# Patient Record
Sex: MALE | Race: WHITE | NOT HISPANIC OR LATINO | Employment: OTHER | URBAN - METROPOLITAN AREA
[De-identification: names, ages, dates, MRNs, and addresses within clinical notes are randomized per-mention and may not be internally consistent; named-entity substitution may affect disease eponyms.]

---

## 2017-08-27 ENCOUNTER — HOSPITAL ENCOUNTER (INPATIENT)
Facility: HOSPITAL | Age: 72
LOS: 2 days | Discharge: HOME | End: 2017-08-29
Attending: INTERNAL MEDICINE | Admitting: INTERNAL MEDICINE

## 2017-08-27 DIAGNOSIS — I24.9 ACS (ACUTE CORONARY SYNDROME) (HCC): Primary | ICD-10-CM

## 2017-08-27 DIAGNOSIS — I25.119 CORONARY ARTERY DISEASE INVOLVING NATIVE CORONARY ARTERY OF NATIVE HEART WITH ANGINA PECTORIS (HCC): ICD-10-CM

## 2017-08-27 PROBLEM — I21.4 NSTEMI (NON-ST ELEVATED MYOCARDIAL INFARCTION) (HCC): Status: ACTIVE | Noted: 2017-08-27

## 2017-08-27 PROBLEM — I10 HYPERTENSION: Chronic | Status: ACTIVE | Noted: 2017-08-27

## 2017-08-27 PROBLEM — I25.10 CAD (CORONARY ARTERY DISEASE): Chronic | Status: ACTIVE | Noted: 2017-08-27

## 2017-08-27 PROBLEM — E78.5 HYPERLIPIDEMIA: Chronic | Status: ACTIVE | Noted: 2017-08-27

## 2017-08-27 PROBLEM — E11.9 DIABETES MELLITUS (HCC): Chronic | Status: ACTIVE | Noted: 2017-08-27

## 2017-08-27 PROBLEM — I10 ACCELERATED HYPERTENSION: Status: ACTIVE | Noted: 2017-08-27

## 2017-08-27 LAB
GLUCOSE BLDC GLUCOMTR-MCNC: 179 MG/DL (ref 70–130)
TROPONIN I SERPL-MCNC: 5.5 NG/ML

## 2017-08-27 PROCEDURE — 93005 ELECTROCARDIOGRAM TRACING: CPT | Performed by: INTERNAL MEDICINE

## 2017-08-27 PROCEDURE — 63710000001 INSULIN DETEMIR PER 5 UNITS: Performed by: FAMILY MEDICINE

## 2017-08-27 PROCEDURE — 25010000002 ENOXAPARIN PER 10 MG: Performed by: FAMILY MEDICINE

## 2017-08-27 PROCEDURE — 84484 ASSAY OF TROPONIN QUANT: CPT | Performed by: FAMILY MEDICINE

## 2017-08-27 PROCEDURE — 82962 GLUCOSE BLOOD TEST: CPT

## 2017-08-27 PROCEDURE — 99291 CRITICAL CARE FIRST HOUR: CPT | Performed by: FAMILY MEDICINE

## 2017-08-27 PROCEDURE — 63710000001 INSULIN LISPRO (HUMAN) PER 5 UNITS: Performed by: FAMILY MEDICINE

## 2017-08-27 PROCEDURE — 93005 ELECTROCARDIOGRAM TRACING: CPT | Performed by: FAMILY MEDICINE

## 2017-08-27 RX ORDER — ACETAMINOPHEN 325 MG/1
650 TABLET ORAL EVERY 4 HOURS PRN
Status: DISCONTINUED | OUTPATIENT
Start: 2017-08-27 | End: 2017-08-29 | Stop reason: HOSPADM

## 2017-08-27 RX ORDER — PANTOPRAZOLE SODIUM 40 MG/1
40 TABLET, DELAYED RELEASE ORAL DAILY
Status: DISCONTINUED | OUTPATIENT
Start: 2017-08-28 | End: 2017-08-29 | Stop reason: HOSPADM

## 2017-08-27 RX ORDER — CLOPIDOGREL BISULFATE 75 MG/1
600 TABLET ORAL ONCE
Status: DISCONTINUED | OUTPATIENT
Start: 2017-08-27 | End: 2017-08-27

## 2017-08-27 RX ORDER — MONTELUKAST SODIUM 10 MG/1
10 TABLET ORAL NIGHTLY
Status: DISCONTINUED | OUTPATIENT
Start: 2017-08-27 | End: 2017-08-29 | Stop reason: HOSPADM

## 2017-08-27 RX ORDER — FENOFIBRATE 50 MG/1
50 CAPSULE ORAL NIGHTLY
COMMUNITY
End: 2017-08-29 | Stop reason: HOSPADM

## 2017-08-27 RX ORDER — PANTOPRAZOLE SODIUM 40 MG/1
40 TABLET, DELAYED RELEASE ORAL DAILY
COMMUNITY

## 2017-08-27 RX ORDER — CLOTRIMAZOLE AND BETAMETHASONE DIPROPIONATE 10; .64 MG/G; MG/G
CREAM TOPICAL 2 TIMES DAILY PRN
COMMUNITY

## 2017-08-27 RX ORDER — LORAZEPAM 2 MG/ML
1 INJECTION INTRAMUSCULAR EVERY 4 HOURS PRN
Status: DISCONTINUED | OUTPATIENT
Start: 2017-08-27 | End: 2017-08-29 | Stop reason: HOSPADM

## 2017-08-27 RX ORDER — DEXTROSE MONOHYDRATE 25 G/50ML
25 INJECTION, SOLUTION INTRAVENOUS
Status: DISCONTINUED | OUTPATIENT
Start: 2017-08-27 | End: 2017-08-29 | Stop reason: HOSPADM

## 2017-08-27 RX ORDER — LISINOPRIL 10 MG/1
10 TABLET ORAL 2 TIMES DAILY
Status: DISCONTINUED | OUTPATIENT
Start: 2017-08-27 | End: 2017-08-29 | Stop reason: HOSPADM

## 2017-08-27 RX ORDER — ASPIRIN 81 MG/1
81 TABLET ORAL DAILY
Status: DISCONTINUED | OUTPATIENT
Start: 2017-08-28 | End: 2017-08-29 | Stop reason: HOSPADM

## 2017-08-27 RX ORDER — NALOXONE HCL 0.4 MG/ML
0.4 VIAL (ML) INJECTION
Status: DISCONTINUED | OUTPATIENT
Start: 2017-08-27 | End: 2017-08-29 | Stop reason: HOSPADM

## 2017-08-27 RX ORDER — FENOFIBRATE 48 MG/1
48 TABLET, COATED ORAL DAILY
Status: DISCONTINUED | OUTPATIENT
Start: 2017-08-27 | End: 2017-08-28

## 2017-08-27 RX ORDER — NITROGLYCERIN 20 MG/100ML
10-50 INJECTION INTRAVENOUS
Status: DISCONTINUED | OUTPATIENT
Start: 2017-08-27 | End: 2017-08-28

## 2017-08-27 RX ORDER — TERAZOSIN 5 MG/1
5 CAPSULE ORAL NIGHTLY
COMMUNITY

## 2017-08-27 RX ORDER — CLOPIDOGREL BISULFATE 75 MG/1
75 TABLET ORAL DAILY
Status: DISCONTINUED | OUTPATIENT
Start: 2017-08-28 | End: 2017-08-27

## 2017-08-27 RX ORDER — ASPIRIN 81 MG/1
324 TABLET, CHEWABLE ORAL ONCE
Status: DISCONTINUED | OUTPATIENT
Start: 2017-08-27 | End: 2017-08-27

## 2017-08-27 RX ORDER — CARVEDILOL 25 MG/1
25 TABLET ORAL 2 TIMES DAILY WITH MEALS
COMMUNITY

## 2017-08-27 RX ORDER — TERAZOSIN 5 MG/1
5 CAPSULE ORAL NIGHTLY
Status: DISCONTINUED | OUTPATIENT
Start: 2017-08-27 | End: 2017-08-29 | Stop reason: HOSPADM

## 2017-08-27 RX ORDER — NATEGLINIDE 120 MG/1
120 TABLET ORAL 3 TIMES DAILY
COMMUNITY

## 2017-08-27 RX ORDER — MONTELUKAST SODIUM 10 MG/1
10 TABLET ORAL NIGHTLY
COMMUNITY

## 2017-08-27 RX ORDER — FAMOTIDINE 20 MG/1
10 TABLET, FILM COATED ORAL 2 TIMES DAILY
Status: DISCONTINUED | OUTPATIENT
Start: 2017-08-27 | End: 2017-08-28

## 2017-08-27 RX ORDER — ONDANSETRON 2 MG/ML
4 INJECTION INTRAMUSCULAR; INTRAVENOUS EVERY 6 HOURS PRN
Status: DISCONTINUED | OUTPATIENT
Start: 2017-08-27 | End: 2017-08-29 | Stop reason: HOSPADM

## 2017-08-27 RX ORDER — PROMETHAZINE HYDROCHLORIDE 25 MG/1
25 TABLET ORAL EVERY 6 HOURS PRN
COMMUNITY

## 2017-08-27 RX ORDER — FAMOTIDINE 10 MG
10 TABLET ORAL 2 TIMES DAILY
COMMUNITY

## 2017-08-27 RX ORDER — SIMVASTATIN 40 MG
40 TABLET ORAL NIGHTLY
COMMUNITY
End: 2017-08-29 | Stop reason: HOSPADM

## 2017-08-27 RX ORDER — LANOLIN ALCOHOL/MO/W.PET/CERES
1000 CREAM (GRAM) TOPICAL
COMMUNITY

## 2017-08-27 RX ORDER — DIPHENHYDRAMINE HYDROCHLORIDE 50 MG/ML
25 INJECTION INTRAMUSCULAR; INTRAVENOUS EVERY 6 HOURS PRN
Status: DISCONTINUED | OUTPATIENT
Start: 2017-08-27 | End: 2017-08-29 | Stop reason: HOSPADM

## 2017-08-27 RX ORDER — AMMONIUM LACTATE 120 MG/G
1 CREAM TOPICAL AS NEEDED
COMMUNITY

## 2017-08-27 RX ORDER — CETIRIZINE HYDROCHLORIDE 10 MG/1
10 TABLET ORAL DAILY
Status: DISCONTINUED | OUTPATIENT
Start: 2017-08-27 | End: 2017-08-29 | Stop reason: HOSPADM

## 2017-08-27 RX ORDER — CARVEDILOL 12.5 MG/1
25 TABLET ORAL 2 TIMES DAILY WITH MEALS
Status: DISCONTINUED | OUTPATIENT
Start: 2017-08-27 | End: 2017-08-29 | Stop reason: HOSPADM

## 2017-08-27 RX ORDER — ATORVASTATIN CALCIUM 40 MG/1
40 TABLET, FILM COATED ORAL NIGHTLY
Status: DISCONTINUED | OUTPATIENT
Start: 2017-08-27 | End: 2017-08-29 | Stop reason: HOSPADM

## 2017-08-27 RX ORDER — NICOTINE POLACRILEX 4 MG
15 LOZENGE BUCCAL
Status: DISCONTINUED | OUTPATIENT
Start: 2017-08-27 | End: 2017-08-29 | Stop reason: HOSPADM

## 2017-08-27 RX ORDER — SODIUM CHLORIDE 0.9 % (FLUSH) 0.9 %
1-10 SYRINGE (ML) INJECTION AS NEEDED
Status: DISCONTINUED | OUTPATIENT
Start: 2017-08-27 | End: 2017-08-29 | Stop reason: HOSPADM

## 2017-08-27 RX ORDER — BUDESONIDE AND FORMOTEROL FUMARATE DIHYDRATE 80; 4.5 UG/1; UG/1
2 AEROSOL RESPIRATORY (INHALATION) 2 TIMES DAILY PRN
COMMUNITY

## 2017-08-27 RX ORDER — MORPHINE SULFATE 2 MG/ML
1 INJECTION, SOLUTION INTRAMUSCULAR; INTRAVENOUS EVERY 4 HOURS PRN
Status: DISCONTINUED | OUTPATIENT
Start: 2017-08-27 | End: 2017-08-27

## 2017-08-27 RX ORDER — BUDESONIDE AND FORMOTEROL FUMARATE DIHYDRATE 80; 4.5 UG/1; UG/1
2 AEROSOL RESPIRATORY (INHALATION)
Status: DISCONTINUED | OUTPATIENT
Start: 2017-08-27 | End: 2017-08-29 | Stop reason: HOSPADM

## 2017-08-27 RX ORDER — LISINOPRIL 10 MG/1
20 TABLET ORAL 2 TIMES DAILY
COMMUNITY
End: 2017-08-29

## 2017-08-27 RX ORDER — ATORVASTATIN CALCIUM 20 MG/1
20 TABLET, FILM COATED ORAL DAILY
Status: DISCONTINUED | OUTPATIENT
Start: 2017-08-27 | End: 2017-08-27

## 2017-08-27 RX ORDER — CETIRIZINE HYDROCHLORIDE 10 MG/1
10 TABLET ORAL DAILY
COMMUNITY

## 2017-08-27 RX ADMIN — CETIRIZINE HYDROCHLORIDE 10 MG: 10 TABLET, FILM COATED ORAL at 21:44

## 2017-08-27 RX ADMIN — NITROGLYCERIN 50 MCG/MIN: 20 INJECTION INTRAVENOUS at 19:47

## 2017-08-27 RX ADMIN — TERAZOSIN HYDROCHLORIDE ANHYDROUS 5 MG: 5 CAPSULE ORAL at 21:44

## 2017-08-27 RX ADMIN — LISINOPRIL 10 MG: 10 TABLET ORAL at 21:44

## 2017-08-27 RX ADMIN — ATORVASTATIN CALCIUM 40 MG: 40 TABLET, FILM COATED ORAL at 21:44

## 2017-08-27 RX ADMIN — CARVEDILOL 25 MG: 12.5 TABLET, FILM COATED ORAL at 21:44

## 2017-08-27 RX ADMIN — INSULIN LISPRO 2 UNITS: 100 INJECTION, SOLUTION INTRAVENOUS; SUBCUTANEOUS at 21:45

## 2017-08-27 RX ADMIN — ENOXAPARIN SODIUM 100 MG: 100 INJECTION SUBCUTANEOUS at 21:44

## 2017-08-27 RX ADMIN — ACETAMINOPHEN 650 MG: 325 TABLET, FILM COATED ORAL at 21:44

## 2017-08-27 RX ADMIN — INSULIN DETEMIR 10 UNITS: 100 INJECTION, SOLUTION SUBCUTANEOUS at 21:44

## 2017-08-28 PROBLEM — E11.9 TYPE 2 DIABETES MELLITUS (HCC): Status: ACTIVE | Noted: 2017-08-27

## 2017-08-28 PROBLEM — I10 ACCELERATED HYPERTENSION: Status: RESOLVED | Noted: 2017-08-27 | Resolved: 2017-08-28

## 2017-08-28 PROBLEM — I10 ESSENTIAL HYPERTENSION: Status: ACTIVE | Noted: 2017-08-27

## 2017-08-28 PROBLEM — I25.119 CORONARY ARTERY DISEASE INVOLVING NATIVE CORONARY ARTERY OF NATIVE HEART WITH ANGINA PECTORIS (HCC): Status: ACTIVE | Noted: 2017-08-27

## 2017-08-28 PROBLEM — I24.9 ACS (ACUTE CORONARY SYNDROME) (HCC): Status: RESOLVED | Noted: 2017-08-27 | Resolved: 2017-08-28

## 2017-08-28 PROBLEM — E78.5 HYPERLIPIDEMIA LDL GOAL <70: Status: ACTIVE | Noted: 2017-08-27

## 2017-08-28 LAB
ACT BLD: 235 SECONDS (ref 82–152)
ANION GAP SERPL CALCULATED.3IONS-SCNC: 3 MMOL/L (ref 3–11)
ARTICHOKE IGE QN: 77 MG/DL (ref 0–130)
BUN BLD-MCNC: 17 MG/DL (ref 9–23)
BUN/CREAT SERPL: 13.1 (ref 7–25)
CALCIUM SPEC-SCNC: 9.1 MG/DL (ref 8.7–10.4)
CHLORIDE SERPL-SCNC: 107 MMOL/L (ref 99–109)
CHOLEST SERPL-MCNC: 134 MG/DL (ref 0–200)
CO2 SERPL-SCNC: 25 MMOL/L (ref 20–31)
CREAT BLD-MCNC: 1.3 MG/DL (ref 0.6–1.3)
GFR SERPL CREATININE-BSD FRML MDRD: 55 ML/MIN/1.73
GLUCOSE BLD-MCNC: 136 MG/DL (ref 70–100)
GLUCOSE BLDC GLUCOMTR-MCNC: 117 MG/DL (ref 70–130)
GLUCOSE BLDC GLUCOMTR-MCNC: 167 MG/DL (ref 70–130)
GLUCOSE BLDC GLUCOMTR-MCNC: 169 MG/DL (ref 70–130)
GLUCOSE BLDC GLUCOMTR-MCNC: 207 MG/DL (ref 70–130)
HBA1C MFR BLD: 6.8 % (ref 4.8–5.6)
HDLC SERPL-MCNC: 23 MG/DL (ref 40–60)
POTASSIUM BLD-SCNC: 4.1 MMOL/L (ref 3.5–5.5)
SODIUM BLD-SCNC: 135 MMOL/L (ref 132–146)
TRIGL SERPL-MCNC: 435 MG/DL (ref 0–150)
TROPONIN I SERPL-MCNC: 7.68 NG/ML
TROPONIN I SERPL-MCNC: 8.66 NG/ML
TSH SERPL DL<=0.05 MIU/L-ACNC: 7.26 MIU/ML (ref 0.35–5.35)

## 2017-08-28 PROCEDURE — C1725 CATH, TRANSLUMIN NON-LASER: HCPCS | Performed by: INTERNAL MEDICINE

## 2017-08-28 PROCEDURE — C1874 STENT, COATED/COV W/DEL SYS: HCPCS | Performed by: INTERNAL MEDICINE

## 2017-08-28 PROCEDURE — 027034Z DILATION OF CORONARY ARTERY, ONE ARTERY WITH DRUG-ELUTING INTRALUMINAL DEVICE, PERCUTANEOUS APPROACH: ICD-10-PCS | Performed by: INTERNAL MEDICINE

## 2017-08-28 PROCEDURE — 25010000002 MIDAZOLAM PER 1 MG: Performed by: INTERNAL MEDICINE

## 2017-08-28 PROCEDURE — 0 IOPAMIDOL PER 1 ML: Performed by: INTERNAL MEDICINE

## 2017-08-28 PROCEDURE — B2111ZZ FLUOROSCOPY OF MULTIPLE CORONARY ARTERIES USING LOW OSMOLAR CONTRAST: ICD-10-PCS | Performed by: INTERNAL MEDICINE

## 2017-08-28 PROCEDURE — 99222 1ST HOSP IP/OBS MODERATE 55: CPT | Performed by: INTERNAL MEDICINE

## 2017-08-28 PROCEDURE — 93458 L HRT ARTERY/VENTRICLE ANGIO: CPT | Performed by: INTERNAL MEDICINE

## 2017-08-28 PROCEDURE — C1769 GUIDE WIRE: HCPCS | Performed by: INTERNAL MEDICINE

## 2017-08-28 PROCEDURE — 93005 ELECTROCARDIOGRAM TRACING: CPT | Performed by: FAMILY MEDICINE

## 2017-08-28 PROCEDURE — 80061 LIPID PANEL: CPT | Performed by: FAMILY MEDICINE

## 2017-08-28 PROCEDURE — 84443 ASSAY THYROID STIM HORMONE: CPT | Performed by: FAMILY MEDICINE

## 2017-08-28 PROCEDURE — 80048 BASIC METABOLIC PNL TOTAL CA: CPT | Performed by: FAMILY MEDICINE

## 2017-08-28 PROCEDURE — 83036 HEMOGLOBIN GLYCOSYLATED A1C: CPT | Performed by: FAMILY MEDICINE

## 2017-08-28 PROCEDURE — C1894 INTRO/SHEATH, NON-LASER: HCPCS | Performed by: INTERNAL MEDICINE

## 2017-08-28 PROCEDURE — 4A023N7 MEASUREMENT OF CARDIAC SAMPLING AND PRESSURE, LEFT HEART, PERCUTANEOUS APPROACH: ICD-10-PCS | Performed by: INTERNAL MEDICINE

## 2017-08-28 PROCEDURE — C1887 CATHETER, GUIDING: HCPCS | Performed by: INTERNAL MEDICINE

## 2017-08-28 PROCEDURE — 84484 ASSAY OF TROPONIN QUANT: CPT | Performed by: FAMILY MEDICINE

## 2017-08-28 PROCEDURE — 92978 ENDOLUMINL IVUS OCT C 1ST: CPT | Performed by: INTERNAL MEDICINE

## 2017-08-28 PROCEDURE — 25010000002 HEPARIN (PORCINE) PER 1000 UNITS: Performed by: INTERNAL MEDICINE

## 2017-08-28 PROCEDURE — C1753 CATH, INTRAVAS ULTRASOUND: HCPCS | Performed by: INTERNAL MEDICINE

## 2017-08-28 PROCEDURE — 92928 PRQ TCAT PLMT NTRAC ST 1 LES: CPT | Performed by: INTERNAL MEDICINE

## 2017-08-28 PROCEDURE — 82962 GLUCOSE BLOOD TEST: CPT

## 2017-08-28 PROCEDURE — C9600 PERC DRUG-EL COR STENT SING: HCPCS | Performed by: INTERNAL MEDICINE

## 2017-08-28 PROCEDURE — 63710000001 INSULIN DETEMIR PER 5 UNITS: Performed by: FAMILY MEDICINE

## 2017-08-28 PROCEDURE — B2151ZZ FLUOROSCOPY OF LEFT HEART USING LOW OSMOLAR CONTRAST: ICD-10-PCS | Performed by: INTERNAL MEDICINE

## 2017-08-28 PROCEDURE — B221Z2Z COMPUTERIZED TOMOGRAPHY (CT SCAN) OF MULTIPLE CORONARY ARTERIES USING INTRAVASCULAR OPTICAL COHERENCE: ICD-10-PCS | Performed by: INTERNAL MEDICINE

## 2017-08-28 PROCEDURE — 85347 COAGULATION TIME ACTIVATED: CPT

## 2017-08-28 PROCEDURE — 25010000002 FENTANYL CITRATE (PF) 100 MCG/2ML SOLUTION: Performed by: INTERNAL MEDICINE

## 2017-08-28 PROCEDURE — 93005 ELECTROCARDIOGRAM TRACING: CPT | Performed by: INTERNAL MEDICINE

## 2017-08-28 DEVICE — XIENCE ALPINE EVEROLIMUS ELUTING CORONARY STENT SYSTEM 4.00 MM X 38 MM / RAPID-EXCHANGE
Type: IMPLANTABLE DEVICE | Status: FUNCTIONAL
Brand: XIENCE ALPINE

## 2017-08-28 RX ORDER — HEPARIN SODIUM 1000 [USP'U]/ML
INJECTION, SOLUTION INTRAVENOUS; SUBCUTANEOUS AS NEEDED
Status: DISCONTINUED | OUTPATIENT
Start: 2017-08-28 | End: 2017-08-28 | Stop reason: HOSPADM

## 2017-08-28 RX ORDER — FENOFIBRATE 145 MG/1
145 TABLET, COATED ORAL DAILY
Status: DISCONTINUED | OUTPATIENT
Start: 2017-08-29 | End: 2017-08-29 | Stop reason: HOSPADM

## 2017-08-28 RX ORDER — FENTANYL CITRATE 50 UG/ML
INJECTION, SOLUTION INTRAMUSCULAR; INTRAVENOUS AS NEEDED
Status: DISCONTINUED | OUTPATIENT
Start: 2017-08-28 | End: 2017-08-28 | Stop reason: HOSPADM

## 2017-08-28 RX ORDER — MIDAZOLAM HYDROCHLORIDE 1 MG/ML
INJECTION INTRAMUSCULAR; INTRAVENOUS AS NEEDED
Status: DISCONTINUED | OUTPATIENT
Start: 2017-08-28 | End: 2017-08-28 | Stop reason: HOSPADM

## 2017-08-28 RX ORDER — NITROGLYCERIN 5 MG/ML
INJECTION, SOLUTION INTRAVENOUS AS NEEDED
Status: DISCONTINUED | OUTPATIENT
Start: 2017-08-28 | End: 2017-08-28 | Stop reason: HOSPADM

## 2017-08-28 RX ORDER — PRASUGREL 5 MG/1
TABLET, FILM COATED ORAL AS NEEDED
Status: DISCONTINUED | OUTPATIENT
Start: 2017-08-28 | End: 2017-08-28 | Stop reason: HOSPADM

## 2017-08-28 RX ORDER — FAMOTIDINE 20 MG/1
20 TABLET, FILM COATED ORAL 2 TIMES DAILY
Status: DISCONTINUED | OUTPATIENT
Start: 2017-08-28 | End: 2017-08-29 | Stop reason: HOSPADM

## 2017-08-28 RX ORDER — SODIUM CHLORIDE 9 MG/ML
1.5 INJECTION, SOLUTION INTRAVENOUS CONTINUOUS
Status: ACTIVE | OUTPATIENT
Start: 2017-08-28 | End: 2017-08-28

## 2017-08-28 RX ORDER — CLOPIDOGREL BISULFATE 75 MG/1
75 TABLET ORAL DAILY
Status: DISCONTINUED | OUTPATIENT
Start: 2017-08-29 | End: 2017-08-29 | Stop reason: HOSPADM

## 2017-08-28 RX ORDER — PHENYLEPHRINE HCL IN 0.9% NACL 0.5 MG/5ML
SYRINGE (ML) INTRAVENOUS AS NEEDED
Status: DISCONTINUED | OUTPATIENT
Start: 2017-08-28 | End: 2017-08-28 | Stop reason: HOSPADM

## 2017-08-28 RX ORDER — LIDOCAINE HYDROCHLORIDE 10 MG/ML
INJECTION, SOLUTION INFILTRATION; PERINEURAL AS NEEDED
Status: DISCONTINUED | OUTPATIENT
Start: 2017-08-28 | End: 2017-08-28 | Stop reason: HOSPADM

## 2017-08-28 RX ORDER — ATROPINE SULFATE 1 MG/ML
INJECTION, SOLUTION INTRAMUSCULAR; INTRAVENOUS; SUBCUTANEOUS AS NEEDED
Status: DISCONTINUED | OUTPATIENT
Start: 2017-08-28 | End: 2017-08-28 | Stop reason: HOSPADM

## 2017-08-28 RX ADMIN — ACETAMINOPHEN 650 MG: 325 TABLET, FILM COATED ORAL at 21:28

## 2017-08-28 RX ADMIN — FAMOTIDINE 10 MG: 20 TABLET, FILM COATED ORAL at 08:16

## 2017-08-28 RX ADMIN — FAMOTIDINE 20 MG: 20 TABLET, FILM COATED ORAL at 17:32

## 2017-08-28 RX ADMIN — MONTELUKAST SODIUM 10 MG: 10 TABLET, FILM COATED ORAL at 20:24

## 2017-08-28 RX ADMIN — LISINOPRIL 10 MG: 10 TABLET ORAL at 17:33

## 2017-08-28 RX ADMIN — CARVEDILOL 25 MG: 12.5 TABLET, FILM COATED ORAL at 17:32

## 2017-08-28 RX ADMIN — INSULIN LISPRO 4 UNITS: 100 INJECTION, SOLUTION INTRAVENOUS; SUBCUTANEOUS at 20:35

## 2017-08-28 RX ADMIN — SODIUM CHLORIDE 1.5 ML/KG/HR: 9 INJECTION, SOLUTION INTRAVENOUS at 17:32

## 2017-08-28 RX ADMIN — ASPIRIN 81 MG: 81 TABLET, COATED ORAL at 08:17

## 2017-08-28 RX ADMIN — INSULIN LISPRO 2 UNITS: 100 INJECTION, SOLUTION INTRAVENOUS; SUBCUTANEOUS at 08:19

## 2017-08-28 RX ADMIN — CETIRIZINE HYDROCHLORIDE 10 MG: 10 TABLET, FILM COATED ORAL at 08:16

## 2017-08-28 RX ADMIN — PANTOPRAZOLE SODIUM 40 MG: 40 TABLET, DELAYED RELEASE ORAL at 08:16

## 2017-08-28 RX ADMIN — CARVEDILOL 25 MG: 12.5 TABLET, FILM COATED ORAL at 08:17

## 2017-08-28 RX ADMIN — ATORVASTATIN CALCIUM 40 MG: 40 TABLET, FILM COATED ORAL at 20:24

## 2017-08-28 RX ADMIN — ACETAMINOPHEN 650 MG: 325 TABLET, FILM COATED ORAL at 04:00

## 2017-08-28 RX ADMIN — INSULIN LISPRO 2 UNITS: 100 INJECTION, SOLUTION INTRAVENOUS; SUBCUTANEOUS at 13:05

## 2017-08-28 RX ADMIN — ACETAMINOPHEN 650 MG: 325 TABLET, FILM COATED ORAL at 11:18

## 2017-08-28 RX ADMIN — TERAZOSIN HYDROCHLORIDE ANHYDROUS 5 MG: 5 CAPSULE ORAL at 20:24

## 2017-08-28 RX ADMIN — INSULIN DETEMIR 10 UNITS: 100 INJECTION, SOLUTION SUBCUTANEOUS at 20:24

## 2017-08-29 VITALS
WEIGHT: 225 LBS | RESPIRATION RATE: 16 BRPM | HEIGHT: 70 IN | HEART RATE: 70 BPM | TEMPERATURE: 97.9 F | SYSTOLIC BLOOD PRESSURE: 157 MMHG | BODY MASS INDEX: 32.21 KG/M2 | OXYGEN SATURATION: 96 % | DIASTOLIC BLOOD PRESSURE: 97 MMHG

## 2017-08-29 LAB
GLUCOSE BLDC GLUCOMTR-MCNC: 151 MG/DL (ref 70–130)
GLUCOSE BLDC GLUCOMTR-MCNC: 172 MG/DL (ref 70–130)

## 2017-08-29 PROCEDURE — 82962 GLUCOSE BLOOD TEST: CPT

## 2017-08-29 PROCEDURE — 99238 HOSP IP/OBS DSCHRG MGMT 30/<: CPT | Performed by: INTERNAL MEDICINE

## 2017-08-29 PROCEDURE — 93010 ELECTROCARDIOGRAM REPORT: CPT | Performed by: INTERNAL MEDICINE

## 2017-08-29 RX ORDER — ATORVASTATIN CALCIUM 40 MG/1
40 TABLET, FILM COATED ORAL NIGHTLY
Qty: 90 TABLET | Refills: 3 | Status: SHIPPED | OUTPATIENT
Start: 2017-08-29

## 2017-08-29 RX ORDER — AMLODIPINE BESYLATE 5 MG/1
5 TABLET ORAL DAILY PRN
COMMUNITY
Start: 2015-06-29

## 2017-08-29 RX ORDER — LISINOPRIL 10 MG/1
10 TABLET ORAL 2 TIMES DAILY
Qty: 30 TABLET | Refills: 2 | Status: SHIPPED | OUTPATIENT
Start: 2017-08-29

## 2017-08-29 RX ORDER — POLYETHYLENE GLYCOL 3350 17 G/17G
17 POWDER, FOR SOLUTION ORAL DAILY PRN
COMMUNITY

## 2017-08-29 RX ORDER — ASPIRIN 81 MG/1
81 TABLET ORAL DAILY
Qty: 90 TABLET | Refills: 3 | Status: SHIPPED | OUTPATIENT
Start: 2017-08-29

## 2017-08-29 RX ORDER — CLOPIDOGREL BISULFATE 75 MG/1
75 TABLET ORAL DAILY
Qty: 90 TABLET | Refills: 3 | Status: SHIPPED | OUTPATIENT
Start: 2017-08-29

## 2017-08-29 RX ORDER — CARVEDILOL 12.5 MG/1
12.5 TABLET ORAL 2 TIMES DAILY
Status: ON HOLD | COMMUNITY
Start: 2015-06-29 | End: 2017-08-29

## 2017-08-29 RX ADMIN — CETIRIZINE HYDROCHLORIDE 10 MG: 10 TABLET, FILM COATED ORAL at 08:22

## 2017-08-29 RX ADMIN — ASPIRIN 81 MG: 81 TABLET, COATED ORAL at 08:22

## 2017-08-29 RX ADMIN — INSULIN LISPRO 2 UNITS: 100 INJECTION, SOLUTION INTRAVENOUS; SUBCUTANEOUS at 11:27

## 2017-08-29 RX ADMIN — CLOPIDOGREL BISULFATE 75 MG: 75 TABLET ORAL at 08:22

## 2017-08-29 RX ADMIN — CARVEDILOL 25 MG: 12.5 TABLET, FILM COATED ORAL at 08:22

## 2017-08-29 RX ADMIN — FAMOTIDINE 20 MG: 20 TABLET, FILM COATED ORAL at 08:22

## 2017-08-29 RX ADMIN — FENOFIBRATE 145 MG: 145 TABLET ORAL at 08:22

## 2017-08-29 RX ADMIN — LISINOPRIL 10 MG: 10 TABLET ORAL at 08:22

## 2017-08-29 RX ADMIN — PANTOPRAZOLE SODIUM 40 MG: 40 TABLET, DELAYED RELEASE ORAL at 08:22

## 2017-09-08 ENCOUNTER — HOSPITAL ENCOUNTER (INPATIENT)
Facility: HOSPITAL | Age: 72
LOS: 2 days | Discharge: HOME | End: 2017-09-12
Attending: EMERGENCY MEDICINE | Admitting: INTERNAL MEDICINE

## 2017-09-08 ENCOUNTER — TELEPHONE (OUTPATIENT)
Dept: CARDIOLOGY | Facility: CLINIC | Age: 72
End: 2017-09-08

## 2017-09-08 ENCOUNTER — APPOINTMENT (OUTPATIENT)
Dept: GENERAL RADIOLOGY | Facility: HOSPITAL | Age: 72
End: 2017-09-08

## 2017-09-08 DIAGNOSIS — R07.9 CHEST PAIN, UNSPECIFIED TYPE: Primary | ICD-10-CM

## 2017-09-08 DIAGNOSIS — R07.89 OTHER CHEST PAIN: Primary | ICD-10-CM

## 2017-09-08 DIAGNOSIS — I20.0 UNSTABLE ANGINA (HCC): ICD-10-CM

## 2017-09-08 DIAGNOSIS — R07.89 OTHER CHEST PAIN: ICD-10-CM

## 2017-09-08 DIAGNOSIS — I25.119 CORONARY ARTERY DISEASE INVOLVING NATIVE CORONARY ARTERY OF NATIVE HEART WITH ANGINA PECTORIS (HCC): ICD-10-CM

## 2017-09-08 PROBLEM — I21.4 NSTEMI (NON-ST ELEVATED MYOCARDIAL INFARCTION) (HCC): Status: RESOLVED | Noted: 2017-08-27 | Resolved: 2017-09-08

## 2017-09-08 LAB
ALBUMIN SERPL-MCNC: 4.6 G/DL (ref 3.2–4.8)
ALBUMIN/GLOB SERPL: 1.5 G/DL (ref 1.5–2.5)
ALP SERPL-CCNC: 55 U/L (ref 25–100)
ALT SERPL W P-5'-P-CCNC: 40 U/L (ref 7–40)
ANION GAP SERPL CALCULATED.3IONS-SCNC: 8 MMOL/L (ref 3–11)
AST SERPL-CCNC: 30 U/L (ref 0–33)
BASOPHILS # BLD AUTO: 0.04 10*3/MM3 (ref 0–0.2)
BASOPHILS NFR BLD AUTO: 0.6 % (ref 0–1)
BILIRUB SERPL-MCNC: 0.4 MG/DL (ref 0.3–1.2)
BNP SERPL-MCNC: 18 PG/ML (ref 0–100)
BUN BLD-MCNC: 16 MG/DL (ref 9–23)
BUN/CREAT SERPL: 16 (ref 7–25)
CALCIUM SPEC-SCNC: 9.6 MG/DL (ref 8.7–10.4)
CHLORIDE SERPL-SCNC: 105 MMOL/L (ref 99–109)
CO2 SERPL-SCNC: 25 MMOL/L (ref 20–31)
CREAT BLD-MCNC: 1 MG/DL (ref 0.6–1.3)
DEPRECATED RDW RBC AUTO: 41.7 FL (ref 37–54)
EOSINOPHIL # BLD AUTO: 0.16 10*3/MM3 (ref 0–0.3)
EOSINOPHIL NFR BLD AUTO: 2.5 % (ref 0–3)
ERYTHROCYTE [DISTWIDTH] IN BLOOD BY AUTOMATED COUNT: 13.5 % (ref 11.3–14.5)
GFR SERPL CREATININE-BSD FRML MDRD: 74 ML/MIN/1.73
GLOBULIN UR ELPH-MCNC: 3 GM/DL
GLUCOSE BLD-MCNC: 112 MG/DL (ref 70–100)
GLUCOSE BLDC GLUCOMTR-MCNC: 105 MG/DL (ref 70–130)
HCT VFR BLD AUTO: 42.3 % (ref 38.9–50.9)
HGB BLD-MCNC: 14 G/DL (ref 13.1–17.5)
HOLD SPECIMEN: NORMAL
HOLD SPECIMEN: NORMAL
IMM GRANULOCYTES # BLD: 0.03 10*3/MM3 (ref 0–0.03)
IMM GRANULOCYTES NFR BLD: 0.5 % (ref 0–0.6)
LIPASE SERPL-CCNC: 46 U/L (ref 6–51)
LYMPHOCYTES # BLD AUTO: 1.66 10*3/MM3 (ref 0.6–4.8)
LYMPHOCYTES NFR BLD AUTO: 26.3 % (ref 24–44)
MCH RBC QN AUTO: 28.1 PG (ref 27–31)
MCHC RBC AUTO-ENTMCNC: 33.1 G/DL (ref 32–36)
MCV RBC AUTO: 84.9 FL (ref 80–99)
MONOCYTES # BLD AUTO: 0.58 10*3/MM3 (ref 0–1)
MONOCYTES NFR BLD AUTO: 9.2 % (ref 0–12)
NEUTROPHILS # BLD AUTO: 3.85 10*3/MM3 (ref 1.5–8.3)
NEUTROPHILS NFR BLD AUTO: 60.9 % (ref 41–71)
PLATELET # BLD AUTO: 173 10*3/MM3 (ref 150–450)
PMV BLD AUTO: 9.9 FL (ref 6–12)
POTASSIUM BLD-SCNC: 4.2 MMOL/L (ref 3.5–5.5)
PROT SERPL-MCNC: 7.6 G/DL (ref 5.7–8.2)
RBC # BLD AUTO: 4.98 10*6/MM3 (ref 4.2–5.76)
SODIUM BLD-SCNC: 138 MMOL/L (ref 132–146)
TROPONIN I SERPL-MCNC: 0 NG/ML (ref 0–0.07)
TROPONIN I SERPL-MCNC: 0 NG/ML (ref 0–0.07)
WBC NRBC COR # BLD: 6.32 10*3/MM3 (ref 3.5–10.8)
WHOLE BLOOD HOLD SPECIMEN: NORMAL
WHOLE BLOOD HOLD SPECIMEN: NORMAL

## 2017-09-08 PROCEDURE — 84484 ASSAY OF TROPONIN QUANT: CPT

## 2017-09-08 PROCEDURE — 71010 HC CHEST PA OR AP: CPT

## 2017-09-08 PROCEDURE — 99223 1ST HOSP IP/OBS HIGH 75: CPT | Performed by: INTERNAL MEDICINE

## 2017-09-08 PROCEDURE — 93005 ELECTROCARDIOGRAM TRACING: CPT | Performed by: EMERGENCY MEDICINE

## 2017-09-08 PROCEDURE — 80053 COMPREHEN METABOLIC PANEL: CPT | Performed by: EMERGENCY MEDICINE

## 2017-09-08 PROCEDURE — 99285 EMERGENCY DEPT VISIT HI MDM: CPT

## 2017-09-08 PROCEDURE — 82962 GLUCOSE BLOOD TEST: CPT

## 2017-09-08 PROCEDURE — 83690 ASSAY OF LIPASE: CPT | Performed by: EMERGENCY MEDICINE

## 2017-09-08 PROCEDURE — 83880 ASSAY OF NATRIURETIC PEPTIDE: CPT | Performed by: EMERGENCY MEDICINE

## 2017-09-08 PROCEDURE — 93005 ELECTROCARDIOGRAM TRACING: CPT | Performed by: NURSE PRACTITIONER

## 2017-09-08 PROCEDURE — 85025 COMPLETE CBC W/AUTO DIFF WBC: CPT | Performed by: EMERGENCY MEDICINE

## 2017-09-08 RX ORDER — NICOTINE POLACRILEX 4 MG
15 LOZENGE BUCCAL
Status: DISCONTINUED | OUTPATIENT
Start: 2017-09-08 | End: 2017-09-12 | Stop reason: HOSPADM

## 2017-09-08 RX ORDER — NITROGLYCERIN 20 MG/100ML
5-200 INJECTION INTRAVENOUS
Status: DISCONTINUED | OUTPATIENT
Start: 2017-09-08 | End: 2017-09-09

## 2017-09-08 RX ORDER — DEXTROSE MONOHYDRATE 25 G/50ML
25 INJECTION, SOLUTION INTRAVENOUS
Status: DISCONTINUED | OUTPATIENT
Start: 2017-09-08 | End: 2017-09-12 | Stop reason: HOSPADM

## 2017-09-08 RX ORDER — NITROGLYCERIN 20 MG/100ML
10-50 INJECTION INTRAVENOUS
Status: DISCONTINUED | OUTPATIENT
Start: 2017-09-09 | End: 2017-09-09

## 2017-09-08 RX ORDER — SODIUM CHLORIDE 0.9 % (FLUSH) 0.9 %
1-10 SYRINGE (ML) INJECTION AS NEEDED
Status: DISCONTINUED | OUTPATIENT
Start: 2017-09-08 | End: 2017-09-12 | Stop reason: HOSPADM

## 2017-09-08 RX ORDER — ASPIRIN 81 MG/1
324 TABLET, CHEWABLE ORAL ONCE
Status: DISCONTINUED | OUTPATIENT
Start: 2017-09-08 | End: 2017-09-12 | Stop reason: HOSPADM

## 2017-09-08 RX ORDER — SODIUM CHLORIDE 0.9 % (FLUSH) 0.9 %
10 SYRINGE (ML) INJECTION AS NEEDED
Status: DISCONTINUED | OUTPATIENT
Start: 2017-09-08 | End: 2017-09-12 | Stop reason: HOSPADM

## 2017-09-08 RX ADMIN — NITROGLYCERIN 5 MCG/MIN: 20 INJECTION INTRAVENOUS at 22:31

## 2017-09-08 NOTE — ED PROVIDER NOTES
Subjective   HPI Comments: Zaid Alvarez is a 72 y.o.male who presents to the ED with c/o chest pain with onset 11 days ago. The patient reports he had a stent placed on August 28, 2017 and since then has been experiencing constant chest pain. He describes the pain as a heaviness and rated it as a 5/10 earlier today but is now rating a 4/10. He notes that it is worsened with movement and had previously radiated to the right side of his back, but is now radiating into his jaw and left arm at times. The patient states he had an MI on the 27th of last month and was seen at a hospital in [place]. He denies nausea, vomiting, SOA, or any other complaints at this time. He notes that he has been on Protonix, as well as took 3x 81 mg of aspirin and 2 nitroglycerin prior to leaving his house for the hospital with no relief. After his stent was placed on the 28th, his cardiologist , told him that he would need another stent placed and will let him know Monday. The patient has no history of GERD.     Patient is a 72 y.o. male presenting with chest pain.   History provided by:  Patient  Chest Pain   Chest pain location: Generalized.  Pain quality: pressure    Pain radiates to:  L jaw, R jaw and L arm  Pain severity:  Mild  Onset quality:  Sudden  Duration: 11 days.  Timing:  Constant  Progression:  Worsening  Chronicity:  New  Context comment:  S/p stent placement  Relieved by:  Nothing  Worsened by:  Movement  Ineffective treatments:  Nitroglycerin and aspirin  Associated symptoms: no nausea, no shortness of breath and no vomiting    Risk factors: coronary artery disease, diabetes mellitus, high cholesterol, hypertension and male sex        Review of Systems   HENT:        Jaw pain.   Respiratory: Negative for shortness of breath.    Cardiovascular: Positive for chest pain (pressure).   Gastrointestinal: Negative for nausea and vomiting.   Musculoskeletal:        Left arm.   All other systems reviewed and are  negative.      Past Medical History:   Diagnosis Date   • CAD (coronary artery disease)    • Diabetes mellitus    • Hyperlipidemia    • Hypertension    • Quit smoking     35 years ago       Allergies   Allergen Reactions   • Morphine And Related Hives     States is allergic to most medications     • Aciphex [Rabeprazole Sodium] Hives   • Codeine Other (See Comments)     Bad side effects   • Levaquin [Levofloxacin In D5w] Hives   • Lortab [Hydrocodone-Acetaminophen] Hives     Patient states he is allergic to most pain medications    • Nicotine Itching   • Penicillins Itching   • Percocet [Oxycodone-Acetaminophen] Hives     Patient states allergic to most pain medications     • Prevacid [Lansoprazole]    • Prilosec [Omeprazole]        Past Surgical History:   Procedure Laterality Date   • CARDIAC CATHETERIZATION N/A 8/28/2017    Procedure: Left Heart Cath;  Surgeon: Terrance Singh MD;  Location:  [facility];  Service:    • CARDIAC CATHETERIZATION N/A 8/28/2017    Procedure: Optical Coherent Tomography;  Surgeon: Terrance Singh MD;  Location:  [facility];  Service:    • CARDIAC CATHETERIZATION N/A 8/28/2017    Procedure: Stent ROBERTO coronary;  Surgeon: Terrance Singh MD;  Location:  [facility];  Service:    • EPIGASTRIC HERNIA REPAIR     • KNEE SURGERY     • SINUS SURGERY      multiple       History reviewed. No pertinent family history.    Social History     Social History   • Marital status:      Spouse name: N/A   • Number of children: N/A   • Years of education: N/A     Social History Main Topics   • Smoking status: Former Smoker     Types: Cigarettes     Quit date: 8/27/1982   • Smokeless tobacco: None      Comment: quit over 30 years ago   • Alcohol use No   • Drug use: No   • Sexual activity: Defer     Other Topics Concern   • None     Social History Narrative   • None         Objective   Physical Exam   Constitutional:  He is oriented to person, place, and time. He appears well-developed and well-nourished. No distress.   HENT:   Head: Normocephalic and atraumatic.   Mouth/Throat: Oropharynx is clear and moist.   Eyes: EOM are normal. Pupils are equal, round, and reactive to light.   Neck: Normal range of motion. Neck supple.   Cardiovascular: Normal rate, regular rhythm, normal heart sounds and intact distal pulses.    Pulmonary/Chest: Effort normal and breath sounds normal. No respiratory distress. He has no wheezes.   Abdominal: Soft. Bowel sounds are normal. He exhibits no distension. There is no tenderness.   Musculoskeletal: Normal range of motion. He exhibits no edema, tenderness or deformity.   Neurological: He is alert and oriented to person, place, and time.   Skin: Skin is warm and dry. He is not diaphoretic.   Psychiatric: He has a normal mood and affect.   Nursing note and vitals reviewed.      Procedures         ED Course  ED Course   Comment By Time   Spoke with Dr. Altman, cardiology, about patient who recommends admission. -LAITH Costa 09/08 2116 2110  Spoke with Dr. Altman, suggests admission with Nitro drip.  I spoke with the patient. The patient continues to have Chest pain. Pt reports that the pain decreased below a 4, but after ambulation to the BR it is back up to 4/10.  Pt advised Dr. Altman recommendation. Pt will be admitted to the hospital for further evaluation, monitoring and repeat cath on Monday. Sri Gomez, APRN 09/08 2125   Spoke with Dr. Colunga who agrees on admission. -KG Justin Costa 09/08 2131           HEART Score  History: Moderately suspicious (+1)  ECG: Non specific repolarization disturbance (+1)  Age: Greater than or equal to 65 (+2)  Risk Factors: 3 or more risk factors OR history of atherosclerotic disease (+2)  Troponin: Normal limit or lower (+0)  Total: 6             MDM    Final diagnoses:   Chest pain, unspecified type       Documentation assistance provided by scribe AUM  KEITH.  Information recorded by the scribe was done at my direction and has been verified and validated by me.     Justin Costa  09/08/17 1911       DARWIN Davis  09/09/17 5632

## 2017-09-08 NOTE — TELEPHONE ENCOUNTER
"Mr Sexton called and says that he has continued to have chest pain since he has the cath and stents.  Says it feels like before his \"heart attack\".   Says that it is a dull ache and sometimes it is more intense than other times.   It can occur any time.   He cannot relate it to any specific cause.  Says that if it is bad his B/P goes real high 191/106.  He went to the ER in [place] on Saturday after his stents and they did not find anything or do anything.   His normal B/P is about 120/70.   He says that he does have Nitro SL but when he used it before he got such a headache he has not taken it since.  Advised him that if he has bad pain he might want to try it to see if it would help.  He is concerned and wants to be seen next week  ASAP.  Also rec him to go to the ER for severe pain or if pain did not wane so he has it comes and goes.    AH  "

## 2017-09-09 LAB
ALBUMIN SERPL-MCNC: 4.5 G/DL (ref 3.2–4.8)
ALBUMIN/GLOB SERPL: 1.5 G/DL (ref 1.5–2.5)
ALP SERPL-CCNC: 55 U/L (ref 25–100)
ALT SERPL W P-5'-P-CCNC: 41 U/L (ref 7–40)
ANION GAP SERPL CALCULATED.3IONS-SCNC: 5 MMOL/L (ref 3–11)
ARTICHOKE IGE QN: 64 MG/DL (ref 0–130)
AST SERPL-CCNC: 30 U/L (ref 0–33)
BASOPHILS # BLD AUTO: 0.02 10*3/MM3 (ref 0–0.2)
BASOPHILS NFR BLD AUTO: 0.3 % (ref 0–1)
BILIRUB SERPL-MCNC: 0.6 MG/DL (ref 0.3–1.2)
BUN BLD-MCNC: 15 MG/DL (ref 9–23)
BUN/CREAT SERPL: 15 (ref 7–25)
CALCIUM SPEC-SCNC: 9.5 MG/DL (ref 8.7–10.4)
CHLORIDE SERPL-SCNC: 105 MMOL/L (ref 99–109)
CHOLEST SERPL-MCNC: 126 MG/DL (ref 0–200)
CO2 SERPL-SCNC: 28 MMOL/L (ref 20–31)
CREAT BLD-MCNC: 1 MG/DL (ref 0.6–1.3)
DEPRECATED RDW RBC AUTO: 42.6 FL (ref 37–54)
EOSINOPHIL # BLD AUTO: 0.16 10*3/MM3 (ref 0–0.3)
EOSINOPHIL NFR BLD AUTO: 2.7 % (ref 0–3)
ERYTHROCYTE [DISTWIDTH] IN BLOOD BY AUTOMATED COUNT: 13.6 % (ref 11.3–14.5)
GFR SERPL CREATININE-BSD FRML MDRD: 74 ML/MIN/1.73
GLOBULIN UR ELPH-MCNC: 3.1 GM/DL
GLUCOSE BLD-MCNC: 151 MG/DL (ref 70–100)
GLUCOSE BLDC GLUCOMTR-MCNC: 130 MG/DL (ref 70–130)
GLUCOSE BLDC GLUCOMTR-MCNC: 144 MG/DL (ref 70–130)
GLUCOSE BLDC GLUCOMTR-MCNC: 153 MG/DL (ref 70–130)
GLUCOSE BLDC GLUCOMTR-MCNC: 158 MG/DL (ref 70–130)
HBA1C MFR BLD: 6.4 % (ref 4.8–5.6)
HCT VFR BLD AUTO: 42.8 % (ref 38.9–50.9)
HDLC SERPL-MCNC: 27 MG/DL (ref 40–60)
HGB BLD-MCNC: 14.3 G/DL (ref 13.1–17.5)
IMM GRANULOCYTES # BLD: 0.03 10*3/MM3 (ref 0–0.03)
IMM GRANULOCYTES NFR BLD: 0.5 % (ref 0–0.6)
LYMPHOCYTES # BLD AUTO: 1.24 10*3/MM3 (ref 0.6–4.8)
LYMPHOCYTES NFR BLD AUTO: 21.1 % (ref 24–44)
MCH RBC QN AUTO: 28.7 PG (ref 27–31)
MCHC RBC AUTO-ENTMCNC: 33.4 G/DL (ref 32–36)
MCV RBC AUTO: 85.8 FL (ref 80–99)
MONOCYTES # BLD AUTO: 0.49 10*3/MM3 (ref 0–1)
MONOCYTES NFR BLD AUTO: 8.3 % (ref 0–12)
NEUTROPHILS # BLD AUTO: 3.93 10*3/MM3 (ref 1.5–8.3)
NEUTROPHILS NFR BLD AUTO: 67.1 % (ref 41–71)
PLATELET # BLD AUTO: 168 10*3/MM3 (ref 150–450)
PMV BLD AUTO: 10 FL (ref 6–12)
POTASSIUM BLD-SCNC: 4 MMOL/L (ref 3.5–5.5)
PROT SERPL-MCNC: 7.6 G/DL (ref 5.7–8.2)
RBC # BLD AUTO: 4.99 10*6/MM3 (ref 4.2–5.76)
SODIUM BLD-SCNC: 138 MMOL/L (ref 132–146)
TRIGL SERPL-MCNC: 285 MG/DL (ref 0–150)
TROPONIN I SERPL-MCNC: <0.006 NG/ML
TSH SERPL DL<=0.05 MIU/L-ACNC: 3.71 MIU/ML (ref 0.35–5.35)
WBC NRBC COR # BLD: 5.87 10*3/MM3 (ref 3.5–10.8)

## 2017-09-09 PROCEDURE — 84484 ASSAY OF TROPONIN QUANT: CPT | Performed by: NURSE PRACTITIONER

## 2017-09-09 PROCEDURE — 93010 ELECTROCARDIOGRAM REPORT: CPT | Performed by: INTERNAL MEDICINE

## 2017-09-09 PROCEDURE — G0378 HOSPITAL OBSERVATION PER HR: HCPCS

## 2017-09-09 PROCEDURE — 80061 LIPID PANEL: CPT | Performed by: NURSE PRACTITIONER

## 2017-09-09 PROCEDURE — 85025 COMPLETE CBC W/AUTO DIFF WBC: CPT | Performed by: NURSE PRACTITIONER

## 2017-09-09 PROCEDURE — 80053 COMPREHEN METABOLIC PANEL: CPT | Performed by: NURSE PRACTITIONER

## 2017-09-09 PROCEDURE — 93005 ELECTROCARDIOGRAM TRACING: CPT | Performed by: NURSE PRACTITIONER

## 2017-09-09 PROCEDURE — 84443 ASSAY THYROID STIM HORMONE: CPT | Performed by: NURSE PRACTITIONER

## 2017-09-09 PROCEDURE — 99233 SBSQ HOSP IP/OBS HIGH 50: CPT | Performed by: INTERNAL MEDICINE

## 2017-09-09 PROCEDURE — 63710000001 INSULIN LISPRO (HUMAN) PER 5 UNITS: Performed by: NURSE PRACTITIONER

## 2017-09-09 PROCEDURE — 99221 1ST HOSP IP/OBS SF/LOW 40: CPT | Performed by: INTERNAL MEDICINE

## 2017-09-09 PROCEDURE — 82962 GLUCOSE BLOOD TEST: CPT

## 2017-09-09 PROCEDURE — 83036 HEMOGLOBIN GLYCOSYLATED A1C: CPT | Performed by: NURSE PRACTITIONER

## 2017-09-09 RX ORDER — POLYETHYLENE GLYCOL 3350 17 G/17G
17 POWDER, FOR SOLUTION ORAL DAILY
Status: DISCONTINUED | OUTPATIENT
Start: 2017-09-09 | End: 2017-09-12 | Stop reason: HOSPADM

## 2017-09-09 RX ORDER — CARVEDILOL 12.5 MG/1
25 TABLET ORAL 2 TIMES DAILY WITH MEALS
Status: DISCONTINUED | OUTPATIENT
Start: 2017-09-09 | End: 2017-09-09

## 2017-09-09 RX ORDER — LISINOPRIL 10 MG/1
10 TABLET ORAL EVERY 12 HOURS SCHEDULED
Status: DISCONTINUED | OUTPATIENT
Start: 2017-09-09 | End: 2017-09-12 | Stop reason: HOSPADM

## 2017-09-09 RX ORDER — CARVEDILOL 12.5 MG/1
25 TABLET ORAL EVERY 12 HOURS SCHEDULED
Status: DISCONTINUED | OUTPATIENT
Start: 2017-09-09 | End: 2017-09-12 | Stop reason: HOSPADM

## 2017-09-09 RX ORDER — PANTOPRAZOLE SODIUM 40 MG/1
40 TABLET, DELAYED RELEASE ORAL
Status: DISCONTINUED | OUTPATIENT
Start: 2017-09-09 | End: 2017-09-12 | Stop reason: HOSPADM

## 2017-09-09 RX ORDER — CETIRIZINE HYDROCHLORIDE 10 MG/1
10 TABLET ORAL DAILY
Status: DISCONTINUED | OUTPATIENT
Start: 2017-09-09 | End: 2017-09-12 | Stop reason: HOSPADM

## 2017-09-09 RX ORDER — ASPIRIN 81 MG/1
81 TABLET ORAL DAILY
Status: DISCONTINUED | OUTPATIENT
Start: 2017-09-09 | End: 2017-09-12 | Stop reason: HOSPADM

## 2017-09-09 RX ORDER — AMLODIPINE BESYLATE 5 MG/1
5 TABLET ORAL
Status: DISCONTINUED | OUTPATIENT
Start: 2017-09-09 | End: 2017-09-12 | Stop reason: HOSPADM

## 2017-09-09 RX ORDER — CLOPIDOGREL BISULFATE 75 MG/1
75 TABLET ORAL DAILY
Status: DISCONTINUED | OUTPATIENT
Start: 2017-09-09 | End: 2017-09-12 | Stop reason: HOSPADM

## 2017-09-09 RX ORDER — ATORVASTATIN CALCIUM 40 MG/1
40 TABLET, FILM COATED ORAL NIGHTLY
Status: DISCONTINUED | OUTPATIENT
Start: 2017-09-09 | End: 2017-09-12 | Stop reason: HOSPADM

## 2017-09-09 RX ADMIN — INSULIN LISPRO 2 UNITS: 100 INJECTION, SOLUTION INTRAVENOUS; SUBCUTANEOUS at 11:44

## 2017-09-09 RX ADMIN — PANTOPRAZOLE SODIUM 40 MG: 40 TABLET, DELAYED RELEASE ORAL at 11:45

## 2017-09-09 RX ADMIN — CETIRIZINE HYDROCHLORIDE 10 MG: 10 TABLET, FILM COATED ORAL at 11:45

## 2017-09-09 RX ADMIN — ASPIRIN 81 MG: 81 TABLET, COATED ORAL at 10:39

## 2017-09-09 RX ADMIN — LISINOPRIL 10 MG: 10 TABLET ORAL at 11:44

## 2017-09-09 RX ADMIN — AMLODIPINE BESYLATE 5 MG: 5 TABLET ORAL at 11:45

## 2017-09-09 RX ADMIN — CARVEDILOL 25 MG: 12.5 TABLET, FILM COATED ORAL at 21:36

## 2017-09-09 RX ADMIN — CARVEDILOL 25 MG: 12.5 TABLET, FILM COATED ORAL at 11:44

## 2017-09-09 RX ADMIN — INSULIN LISPRO 2 UNITS: 100 INJECTION, SOLUTION INTRAVENOUS; SUBCUTANEOUS at 07:45

## 2017-09-09 RX ADMIN — ATORVASTATIN CALCIUM 40 MG: 40 TABLET, FILM COATED ORAL at 21:36

## 2017-09-09 RX ADMIN — LISINOPRIL 10 MG: 10 TABLET ORAL at 21:36

## 2017-09-09 RX ADMIN — CLOPIDOGREL BISULFATE 75 MG: 75 TABLET ORAL at 10:38

## 2017-09-09 NOTE — PROGRESS NOTES
"      HOSPITALIST DAILY PROGRESS NOTE    Chief Complaint: chest pain    Subjective   SUBJECTIVE/OVERNIGHT EVENTS   No acute events overnight, patient states that he had a ok night, CP is still ongoing, its substernal , worse with movement radiates to his chin    Review of Systems:  Gen-no fevers, no chills  CV-+ chest pain, no palpitations  Resp-no cough, no dyspnea  GI-no N/V/D, no abd pain    Otherwise complete ROS is negative except as mentioned in the HPI.    Objective   OBJECTIVE   I have reviewed the vital signs.  /89 (BP Location: Left arm, Patient Position: Lying)  Pulse 68  Temp 99.2 °F (37.3 °C) (Oral)   Resp 18  Ht 69.5\" (176.5 cm)  Wt 222 lb (101 kg)  SpO2 96%  BMI 32.31 kg/m2    Physical Exam:  Gen-no acute distress, comfortable  CV-RRR, S1 S2 normal, no m/r/g  Resp-CTAB, no wheezes  Abd-soft, NT, ND, +BS  Ext-no edema  Neuro-A&Ox3, no focal deficits  Psych-appropriate mood and affectr    Results:  I have reviewed the labs, radiology results, and diagnostic studies.      Results from last 7 days  Lab Units 09/08/17  1821   WBC 10*3/mm3 6.32   HEMOGLOBIN g/dL 14.0   HEMATOCRIT % 42.3   PLATELETS 10*3/mm3 173       Results from last 7 days  Lab Units 09/08/17  1821   SODIUM mmol/L 138   POTASSIUM mmol/L 4.2   CHLORIDE mmol/L 105   CO2 mmol/L 25.0   BUN mg/dL 16   CREATININE mg/dL 1.00   GLUCOSE mg/dL 112*   CALCIUM mg/dL 9.6     Radiology Results:  Imaging Results (last 24 hours)     Procedure Component Value Units Date/Time    XR Chest 1 View [[id number]]  (Abnormal) Collected:  09/08/17 1817     Updated:  09/08/17 1957    Narrative:       EXAM:    XR Chest, 1 View    EXAM DATE/TIME:    9/8/2017 6:17 PM    CLINICAL HISTORY:    72 years old, male; Pain; Chest pain; Left-sided chest pain; Prior surgery;   Surgery date: <1 month; Surgery type: Cardiac stent 8/28/17; Additional info:   Chest pain triage protocol    TECHNIQUE:    Frontal view of the chest.    COMPARISON:    CR - XR CHEST 1 VIEW " PORTABLE 5/26/2015 10:59:52 AM.    FINDINGS:    Lungs:  No significant pulmonary vascular congestion.  No focal pulmonary   consolidation.    Pleural space:  No pleural effusion or pneumothorax.    Mediastinum:  The cardiomediastinal silhouette is borderline in size.  There   may be an underlying hiatal hernia.    Bones/joints:  Old left rib fractures again seen.  No evidence of acute   osseous abnormality.    Upper abdomen:  Left upper abdominal region surgical clips seen.      Impression:         No evidence of acute cardiopulmonary abnormality radiographically. Findings   as described.    THIS DOCUMENT HAS BEEN ELECTRONICALLY SIGNED BY ANTHONY OCHOA MD          I have reviewed the medications.    Assessment/Plan   ASSESSMENT/PLAN    Principal Problem:    Unstable angina  Active Problems:    Coronary artery disease involving native coronary artery of native heart with angina pectoris    Type 2 diabetes mellitus    Hyperlipidemia LDL goal <70    Essential hypertension    Chest pain    Mr. Alvarez is a 73 yo WM w/ PMH of CAD s/p recent LHC with stent placement and known residual stenosis (8/28/17) who presents with recurrent left sided chest pain.  We were asked to admit for Cardiology.     Plan:  --continue NTG gtt, titrate as tolerated  --Cardiology consulted  --troponins negative thus far and EKG unremarkable, will trend and monitor  --NPO for possible LHC today    Dispo: anticipate d/c in 1-2 days    Mingo Laguna MD  09/09/17  8:31 AM

## 2017-09-09 NOTE — PLAN OF CARE
Problem: Pain, Acute (Adult)  Goal: Acceptable Pain Control/Comfort Level  Outcome: Ongoing (interventions implemented as appropriate)    09/08/17 2316   Pain, Acute (Adult)   Acceptable Pain Control/Comfort Level making progress toward outcome

## 2017-09-09 NOTE — CONSULTS
[facility] at [facility]  CARDIOLOGY CONSULTATION NOTE    Zaid Alvarez  : 1945  MRN:[id number]  Home Phone:[phone]    Date of Admission:2017  Date of Consultation: 17    PCP: Kedar Kuhn MD    IDENTIFICATION: A 72 y.o.  white male resident of [place], [place]     Chief Complaint   Patient presents with   • Chest Pain       PROBLEM LIST:   1.  Coronary artery disease:  a. Left heart catheterization, , with  diffuse non-obstructive plaque and normal LVEF.  b. Lexiscan Cardiolite, 2015, revealing a small-sized mildly reversible basolateral defect and EF of 61%, normal electrocardiographic and hemodynamic response to Lexiscan.  c. Left heart catheterization,  2015 by Dr. Stock, revealing non-occlusive coronary artery disease of the first diagonal branch, 40% in mid, less than 20% of the LAD, and EF of 65%.  d. NSTEMI 17 with [facility] demonstrating mid RCA stenosis, s/p EES under OCT guidance, 70% stenosis in first diagonal unchanged from previous [facility]  with no intervention, normal LVEF  e. Re-admission for chest pain 2017  2. Type 2 diabetes mellitus, onset prior to   3. Dyslipidemia  4. Hypertension  5. Obesity.   6. Surgical history:  a. Sinus surgery x6 in .  b. Prior esophageal hernia repair by Dr. Tejada.  c. Bilateral knee surgery.     ALLERGIES:   Allergies   Allergen Reactions   • Morphine And Related Hives     States is allergic to most medications     • Aciphex [Rabeprazole Sodium] Hives   • Codeine Other (See Comments)     Bad side effects   • Levaquin [Levofloxacin In D5w] Hives   • Lortab [Hydrocodone-Acetaminophen] Hives     Patient states he is allergic to most pain medications    • Nicotine Itching   • Penicillins Itching   • Percocet [Oxycodone-Acetaminophen] Hives     Patient states allergic to most pain medications     • Prevacid [Lansoprazole]    • Prilosec [Omeprazole]        HPI: Mr. Alvarez is a pleasant 72  "y/o WM with history of CAD, HTN, HLD and DM2, with recent NSTEMI who is seen in consultation for chest pain. He is followed by Dr. Mathews. His catheterization on 8/28 revealed \"culprit\" mid RCA stenosis treated with placement of EES under OCT guidance. He was also noted to have a first diagonal lesion which was unchanged when compared to cath from 2015. He has normal LVEF. He states he has been having chest pain since his NSTEMI and intervention. He presented to the ER in [place] a few days post cath for chest pain and elevated BP and ruled out. He continued to have chest pain and notes his BP has been high, therefore called our office yesterday and an order for OP cath is noted. He however presented to the ED due to worsening symptoms. He states his pain is a constant dull ache in precordial region that will intensify with exertion and at times radiate up his neck. At rest it is about 3-4/10, and will intensify to 6-7/10 with movement. He denies shortness of breath, palpitations or CHF symptoms. He has been compliant with all medications. His troponins have been negative x3 and EKG shows no acute ischemic changes. He notes this pain is exactly the same as his angina at time of NSTEMI, just less severe.     It is noteworthy that on discharge following RCA stent, he did NOT start Plavix for 48-72 hours.      ROS: All systems have been reviewed and are negative with the exception of those mentioned in the HPI and problem list above.    Surgical History:   Past Surgical History:   Procedure Laterality Date   • CARDIAC CATHETERIZATION N/A 8/28/2017    Procedure: Left Heart Cath;  Surgeon: Terrance Singh MD;  Location:  [facility];  Service:    • CARDIAC CATHETERIZATION N/A 8/28/2017    Procedure: Optical Coherent Tomography;  Surgeon: Terrance Singh MD;  Location:  [facility];  Service:    • CARDIAC CATHETERIZATION N/A 8/28/2017    Procedure: Stent ROBERTO coronary; " " Surgeon: Terrance Singh MD;  Location: [facility] INVASIVE LOCATION;  Service:    • EPIGASTRIC HERNIA REPAIR     • KNEE SURGERY     • SINUS SURGERY      multiple       Social History:   Social History     Social History   • Marital status:      Spouse name: N/A   • Number of children: N/A   • Years of education: N/A     Occupational History   • Not on file.     Social History Main Topics   • Smoking status: Former Smoker     Types: Cigarettes     Quit date: 8/27/1982   • Smokeless tobacco: Not on file      Comment: quit over 30 years ago   • Alcohol use No   • Drug use: No   • Sexual activity: Defer     Family History: History reviewed. No pertinent family history.    Objective     /86  Pulse 75  Temp 99.2 °F (37.3 °C) (Oral)   Resp 18  Ht 69.5\" (176.5 cm)  Wt 222 lb (101 kg)  SpO2 95%  BMI 32.31 kg/m2    Intake/Output Summary (Last 24 hours) at 09/09/17 1017  Last data filed at 09/09/17 0400   Gross per 24 hour   Intake                0 ml   Output              250 ml   Net             -250 ml       PHYSICAL EXAM:  Constitutional:  Well-nourished, cooperative, in no acute distress.   Head:  Normocephalic, without obvious abnormality, atraumatic.   Neck: No adenopathy, supple, trachea midline, no thyromegaly, no    carotid bruit, no JVD.   Respiratory:   Clear to auscultation bilaterally; respirations regular, even and unlabored. No wheezes, rales or ronchi.    Cardiovascular:  Regular rhythm and normal rate, normal S1 and S2, no            murmur, no gallop, no rub, no click.   Pulses: Peripheral pulses are present and equal bilaterally.   GI:   Soft, non-distended. Bowel sounds heard throughout. No organomegaly or masses. Non-tender to palpation, no guarding.   Extremities: No edema, clubbing or cyanosis.   Skin: Skin is warm and dry. No bleeding, bruising or rash.   Neurological: Alert, oriented to time, person and place. No focal deficits.     Labs/Diagnostic Data    Results from " last 7 days  Lab Units 09/09/17  0857 09/08/17  1821   SODIUM mmol/L 138 138   POTASSIUM mmol/L 4.0 4.2   CHLORIDE mmol/L 105 105   CO2 mmol/L 28.0 25.0   BUN mg/dL 15 16   CREATININE mg/dL 1.00 1.00   GLUCOSE mg/dL 151* 112*   CALCIUM mg/dL 9.5 9.6       Results from last 7 days  Lab Units 09/09/17  0004   TROPONIN I ng/mL <0.006       Results from last 7 days  Lab Units 09/09/17  0857 09/08/17  1821   WBC 10*3/mm3 5.87 6.32   HEMOGLOBIN g/dL 14.3 14.0   HEMATOCRIT % 42.8 42.3   PLATELETS 10*3/mm3 168 173           Results from last 7 days  Lab Units 09/09/17  0857   CHOLESTEROL mg/dL 126   TRIGLYCERIDES mg/dL 285*   HDL CHOL mg/dL 27*   LDL CHOL mg/dL 64       Results from last 7 days  Lab Units 09/09/17  0857   TSH mIU/mL 3.715       Results from last 7 days  Lab Units 09/09/17  0857   HEMOGLOBIN A1C % 6.40*             I personally reviewed the patient's EKG/Telemetry data    Radiology Data:   [facility] 8/28/17:  Conclusion         IMPRESSION:  · The culprit for the non-STEMI was a 80% eccentric stenosis of the mid RCA.  · Additionally, there was a 70% stenosis of the first diagonal branch which appeared unchanged from previous angiography in 2015.  · Normal LV systolic function  · Normal hemodynamics  · Successful OCT guided PCI of the RCA using a Xience 4 x 38 mmHg drug-eluting stent.     RECOMMENDATIONS:  · DAPT (aspirin 81 mg + clopidogrel 75 mg daily) for one year  · High intensity statin therapy  · If patient has CCS class 2+ anginal symptoms on 2 antianginal medications, may consider PCI of the diagonal branch.     Current Medications:      aspirin 324 mg Oral Once   insulin lispro 0-7 Units Subcutaneous 4x Daily AC & at Bedtime       nitroglycerin 5-200 mcg/min Last Rate: 5 mcg/min (09/08/17 2231)   nitroglycerin 10-50 mcg/min Last Rate: Stopped (09/08/17 5524)       Assessment and Plan:     1. CAD with chest pain   - recent NSTEMI with EES to mid RCA, normal LVEF  - 70% stenosis in diagonal branch unchanged  "from 2015  - patient presents with recurrent angina, troponins negative x3, EKG no acute ischemic changes  - on ASA, Plavix not re-started on admission- will go ahead and give ASA and Plavix now     2. HTN  - controlled  - re-start home meds    3. HLD  - re-start Lipitor    4. DM2  - per hospitalists     I have interviewed and examined the patient.  I have reviewed his medical record.  I have reviewed films from his cardiac catheterization/intervention on August 28.    Based on all available data I think that it is extremely unlikely that current symptoms are due to a cardiac etiology.  Nonetheless, because they are occurring and a refractory pattern (noting that her office and already talked to him about a \"heart catheter\" Monday) we will keep him here and ask Dr. Singh in all to carry out a heart catheter on Monday if they feel that it is indicated.  I discussed this in detail with the patient.    I, Iron Colunga MD, personally performed the services described as documented by the above named individual. I have made any necessary edits and it is both accurate and complete 9/9/2017  11:23 AM    Scribed for Iron Colunga MD by Michelle Mares PA-C. 9/9/2017  10:17 AM      Thank you for allowing me to participate in the care of Zaid Alvarez. Feel free to contact me directly with any further questions or concerns.  "

## 2017-09-10 LAB
GLUCOSE BLDC GLUCOMTR-MCNC: 137 MG/DL (ref 70–130)
GLUCOSE BLDC GLUCOMTR-MCNC: 150 MG/DL (ref 70–130)
GLUCOSE BLDC GLUCOMTR-MCNC: 169 MG/DL (ref 70–130)
GLUCOSE BLDC GLUCOMTR-MCNC: 179 MG/DL (ref 70–130)

## 2017-09-10 PROCEDURE — 99232 SBSQ HOSP IP/OBS MODERATE 35: CPT | Performed by: INTERNAL MEDICINE

## 2017-09-10 PROCEDURE — G0378 HOSPITAL OBSERVATION PER HR: HCPCS

## 2017-09-10 PROCEDURE — 82962 GLUCOSE BLOOD TEST: CPT

## 2017-09-10 RX ADMIN — LISINOPRIL 10 MG: 10 TABLET ORAL at 08:17

## 2017-09-10 RX ADMIN — INSULIN LISPRO 2 UNITS: 100 INJECTION, SOLUTION INTRAVENOUS; SUBCUTANEOUS at 08:19

## 2017-09-10 RX ADMIN — ATORVASTATIN CALCIUM 40 MG: 40 TABLET, FILM COATED ORAL at 21:12

## 2017-09-10 RX ADMIN — CARVEDILOL 25 MG: 12.5 TABLET, FILM COATED ORAL at 21:12

## 2017-09-10 RX ADMIN — CLOPIDOGREL BISULFATE 75 MG: 75 TABLET ORAL at 08:16

## 2017-09-10 RX ADMIN — ASPIRIN 81 MG: 81 TABLET, COATED ORAL at 08:21

## 2017-09-10 RX ADMIN — CARVEDILOL 25 MG: 12.5 TABLET, FILM COATED ORAL at 08:16

## 2017-09-10 RX ADMIN — INSULIN LISPRO 2 UNITS: 100 INJECTION, SOLUTION INTRAVENOUS; SUBCUTANEOUS at 16:51

## 2017-09-10 RX ADMIN — INSULIN LISPRO 2 UNITS: 100 INJECTION, SOLUTION INTRAVENOUS; SUBCUTANEOUS at 11:47

## 2017-09-10 RX ADMIN — LISINOPRIL 10 MG: 10 TABLET ORAL at 21:12

## 2017-09-10 RX ADMIN — PANTOPRAZOLE SODIUM 40 MG: 40 TABLET, DELAYED RELEASE ORAL at 06:24

## 2017-09-10 RX ADMIN — CETIRIZINE HYDROCHLORIDE 10 MG: 10 TABLET, FILM COATED ORAL at 08:17

## 2017-09-10 NOTE — PROGRESS NOTES
"      HOSPITALIST DAILY PROGRESS NOTE    Chief Complaint: chest pain    Subjective   SUBJECTIVE/OVERNIGHT EVENTS   No acute events overnight, patient states that he is still having some substernal chest pain, denies any palpitations, no n/v or SOA    Review of Systems:  Gen-no fevers, no chills  CV-+ chest pain, no palpitations  Resp-no cough, no dyspnea  GI-no N/V/D, no abd pain    Otherwise complete ROS is negative except as mentioned in the HPI.    Objective   OBJECTIVE   I have reviewed the vital signs.  /69 (BP Location: Right arm, Patient Position: Lying)  Pulse 59  Temp 97.9 °F (36.6 °C) (Oral)   Resp 18  Ht 69.5\" (176.5 cm)  Wt 222 lb (101 kg)  SpO2 94%  BMI 32.31 kg/m2    Physical Exam:  Gen-no acute distress, laying in bed comfortably  CV-RRR, S1 S2 normal, no m/r/g  Resp-CTAB, no wheezes  Abd-obese, soft, NT, ND, +BS  Ext-no edema  Neuro-A&Ox3, no focal deficits  Psych-appropriate mood and affect    Results:  I have reviewed the labs.      Results from last 7 days  Lab Units 09/09/17  0857 09/08/17  1821   WBC 10*3/mm3 5.87 6.32   HEMOGLOBIN g/dL 14.3 14.0   HEMATOCRIT % 42.8 42.3   PLATELETS 10*3/mm3 168 173       Results from last 7 days  Lab Units 09/09/17  0857   SODIUM mmol/L 138   POTASSIUM mmol/L 4.0   CHLORIDE mmol/L 105   CO2 mmol/L 28.0   BUN mg/dL 15   CREATININE mg/dL 1.00   GLUCOSE mg/dL 151*   CALCIUM mg/dL 9.5     I have reviewed the medications.      Assessment/Plan   ASSESSMENT/PLAN    Principal Problem:    Unstable angina  Active Problems:    Coronary artery disease involving native coronary artery of native heart with angina pectoris    Type 2 diabetes mellitus    Hyperlipidemia LDL goal <70    Essential hypertension    Chest pain    Mr. Alvarez is a 73 yo WM w/ PMH of CAD s/p recent LHC with stent placement and known residual stenosis (8/28/17) who presents with recurrent left sided chest pain.  We were asked to admit for Cardiology.      Plan:  --Cardiology consulted, will " re-evaluate Monday for possible LHC, continue ASA and Plavix for now  --troponins negative thus far and EKG unremarkable  -- resume home rx  --NPO AM for possible LHC 9/11     Dispo: anticipate d/c in 1-2 days    Mingo Laguna MD  09/10/17  6:50 AM

## 2017-09-10 NOTE — PLAN OF CARE
Problem: Patient Care Overview (Adult)  Goal: Plan of Care Review  Outcome: Ongoing (interventions implemented as appropriate)    09/10/17 1616   Coping/Psychosocial Response Interventions   Plan Of Care Reviewed With patient   Patient Care Overview   Progress progress toward functional goals as expected   Outcome Evaluation   Outcome Summary/Follow up Plan patient still complaining of cont chest pain of a 1, consent signed for left heart cath tomorrow         Problem: Acute Coronary Syndrome (ACS) (Adult)  Goal: Signs and Symptoms of Listed Potential Problems Will be Absent or Manageable (Acute Coronary Syndrome)  Outcome: Ongoing (interventions implemented as appropriate)    09/10/17 1616   Acute Coronary Syndrome (ACS)   Problems Assessed (Acute Coronary Syndrome (ACS)) all   Problems Present (Acute Coronary Syndrome (ACS)) chest pain (angina)            5

## 2017-09-10 NOTE — PROGRESS NOTES
"  [facility]  PROGRESS NOTE    Date of Admission: 9/8/2017  Length of Stay: 1  Primary Care Physician: Kedar Kuhn MD    Chief Complaint: f/u chest pain   Problem List:   1.  Coronary artery disease:  a. Left heart catheterization, 2009, with  diffuse non-obstructive plaque and normal LVEF.  b. Lexiscan Cardiolite, 05/27/2015, revealing a small-sized mildly reversible basolateral defect and EF of 61%, normal electrocardiographic and hemodynamic response to Lexiscan.  c. Left heart catheterization,  05/28/2015 by Dr. Stock, revealing non-occlusive coronary artery disease of the first diagonal branch, 40% in mid, less than 20% of the LAD, and EF of 65%.  d. NSTEMI 8/28/17 with [facility] demonstrating mid RCA stenosis, s/p EES under OCT guidance, 70% stenosis in first diagonal unchanged from previous [facility] 2015 with no intervention, normal LVEF  e. Re-admission for chest pain 9/9/2017  2. Type 2 diabetes mellitus, onset prior to 2004  3. Dyslipidemia  4. Hypertension  5. Obesity.   6. Surgical history:  a. Sinus surgery x6 in 1997.  b. Prior esophageal hernia repair by Dr. Tejada.  c. Bilateral knee surgery.   Subjective      Still has the chest discomfort worse if he moves around, no shortness of breath.       Objective   Vitals: /71 (BP Location: Left arm, Patient Position: Lying)  Pulse 73  Temp 98.3 °F (36.8 °C) (Oral)   Resp 18  Ht 69.5\" (176.5 cm)  Wt 222 lb (101 kg)  SpO2 95%  BMI 32.31 kg/m2    Physical Exam:  GENERAL: Alert, cooperative, in no acute distress.   HEENT: Fundoscopic deferred, otherwise unremarkable.  NECK: No Jugular venous distention, adenopathy, or thyromegaly noted.   HEART: No discrete PMI is noted. Regular rhythm, normal rate, and no murmur  LUNGS: Clear to auscultation bilaterally. No wheezing, rales or ronchi.  ABDOMEN: Flat without evidence of organomegaly, masses, or tenderness.  NEUROLOGIC: No focal abnormalities involving strength or " sensation are noted.   EXTREMITIES: No clubbing, cyanosis, or edema noted.     Results:    Results from last 7 days  Lab Units 09/09/17  0857 09/08/17  1821   WBC 10*3/mm3 5.87 6.32   HEMOGLOBIN g/dL 14.3 14.0   HEMATOCRIT % 42.8 42.3   PLATELETS 10*3/mm3 168 173       Results from last 7 days  Lab Units 09/09/17  0857 09/08/17  1821   SODIUM mmol/L 138 138   POTASSIUM mmol/L 4.0 4.2   CHLORIDE mmol/L 105 105   CO2 mmol/L 28.0 25.0   BUN mg/dL 15 16   CREATININE mg/dL 1.00 1.00   GLUCOSE mg/dL 151* 112*      Lab Results   Component Value Date    CHOL 126 09/09/2017    TRIG 285 (H) 09/09/2017    HDL 27 (L) 09/09/2017    LDLDIRECT 64 09/09/2017    AST 30 09/09/2017    ALT 41 (H) 09/09/2017       Results from last 7 days  Lab Units 09/09/17  0857   HEMOGLOBIN A1C % 6.40*       Results from last 7 days  Lab Units 09/09/17  0857   TSH mIU/mL 3.715       Results from last 7 days  Lab Units 09/08/17  1821   BNP pg/mL 18.0           Results from last 7 days  Lab Units 09/09/17  0004   TROPONIN I ng/mL <0.006       Intake/Output Summary (Last 24 hours) at 09/10/17 1232  Last data filed at 09/10/17 0800   Gross per 24 hour   Intake                0 ml   Output             1950 ml   Net            -1950 ml     I personally reviewed the patient's EKG/Telemetry data    Current Medications:    amLODIPine 5 mg Oral Q24H   aspirin 324 mg Oral Once   aspirin 81 mg Oral Daily   atorvastatin 40 mg Oral Nightly   carvedilol 25 mg Oral Q12H   cetirizine 10 mg Oral Daily   clopidogrel 75 mg Oral Daily   insulin lispro 0-7 Units Subcutaneous 4x Daily AC & at Bedtime   lisinopril 10 mg Oral Q12H   pantoprazole 40 mg Oral Q AM   polyethylene glycol 17 g Oral Daily          Assessment and Plan:   1. CAD with chest pain   - recent NSTEMI with EES to mid RCA, normal LVEF  - 70% stenosis in diagonal branch unchanged from 2015  - patient presents with recurrent angina, troponins negative x3, EKG no acute ischemic changes  - on DAPT and statin    - will make NPO after midnight for possible LHC tomorrow am with [facility]     2. HTN  - controlled     3. HLD  - re-start Lipitor     4. DM2  - per hospitalists     I, Iron Colunga MD, personally performed the services described as documented by the above named individual. I have made any necessary edits and it is both accurate and complete 9/10/2017  7:13 PM      Scribed for Iron Colunga MD by Michelle Mares PA-C.

## 2017-09-11 PROBLEM — I25.110 CORONARY ARTERY DISEASE INVOLVING NATIVE CORONARY ARTERY OF NATIVE HEART WITH UNSTABLE ANGINA PECTORIS (HCC): Status: ACTIVE | Noted: 2017-08-27

## 2017-09-11 PROBLEM — R07.9 CHEST PAIN: Status: RESOLVED | Noted: 2017-09-08 | Resolved: 2017-09-11

## 2017-09-11 PROBLEM — I20.0 UNSTABLE ANGINA (HCC): Status: RESOLVED | Noted: 2017-09-08 | Resolved: 2017-09-11

## 2017-09-11 LAB
ACT BLD: 241 SECONDS (ref 82–152)
ACT BLD: 246 SECONDS (ref 82–152)
ACT BLD: 279 SECONDS (ref 82–152)
ACT BLD: 318 SECONDS (ref 82–152)
ACT BLD: 318 SECONDS (ref 82–152)
GLUCOSE BLDC GLUCOMTR-MCNC: 173 MG/DL (ref 70–130)
GLUCOSE BLDC GLUCOMTR-MCNC: 173 MG/DL (ref 70–130)
GLUCOSE BLDC GLUCOMTR-MCNC: 195 MG/DL (ref 70–130)

## 2017-09-11 PROCEDURE — 85347 COAGULATION TIME ACTIVATED: CPT

## 2017-09-11 PROCEDURE — C1894 INTRO/SHEATH, NON-LASER: HCPCS | Performed by: INTERNAL MEDICINE

## 2017-09-11 PROCEDURE — 25010000002 FENTANYL CITRATE (PF) 100 MCG/2ML SOLUTION: Performed by: INTERNAL MEDICINE

## 2017-09-11 PROCEDURE — C1887 CATHETER, GUIDING: HCPCS | Performed by: INTERNAL MEDICINE

## 2017-09-11 PROCEDURE — 0 IOPAMIDOL PER 1 ML: Performed by: INTERNAL MEDICINE

## 2017-09-11 PROCEDURE — C1760 CLOSURE DEV, VASC: HCPCS | Performed by: INTERNAL MEDICINE

## 2017-09-11 PROCEDURE — 63710000001 INSULIN LISPRO (HUMAN) PER 5 UNITS: Performed by: NURSE PRACTITIONER

## 2017-09-11 PROCEDURE — 93454 CORONARY ARTERY ANGIO S&I: CPT | Performed by: INTERNAL MEDICINE

## 2017-09-11 PROCEDURE — G0378 HOSPITAL OBSERVATION PER HR: HCPCS

## 2017-09-11 PROCEDURE — 93005 ELECTROCARDIOGRAM TRACING: CPT | Performed by: INTERNAL MEDICINE

## 2017-09-11 PROCEDURE — 25010000002 HEPARIN (PORCINE) PER 1000 UNITS: Performed by: INTERNAL MEDICINE

## 2017-09-11 PROCEDURE — 0 IOPAMIDOL PER 1 ML

## 2017-09-11 PROCEDURE — C1769 GUIDE WIRE: HCPCS | Performed by: INTERNAL MEDICINE

## 2017-09-11 PROCEDURE — C1724 CATH, TRANS ATHEREC,ROTATION: HCPCS | Performed by: INTERNAL MEDICINE

## 2017-09-11 PROCEDURE — C1874 STENT, COATED/COV W/DEL SYS: HCPCS | Performed by: INTERNAL MEDICINE

## 2017-09-11 PROCEDURE — 027034Z DILATION OF CORONARY ARTERY, ONE ARTERY WITH DRUG-ELUTING INTRALUMINAL DEVICE, PERCUTANEOUS APPROACH: ICD-10-PCS | Performed by: INTERNAL MEDICINE

## 2017-09-11 PROCEDURE — 92978 ENDOLUMINL IVUS OCT C 1ST: CPT | Performed by: INTERNAL MEDICINE

## 2017-09-11 PROCEDURE — C1725 CATH, TRANSLUMIN NON-LASER: HCPCS | Performed by: INTERNAL MEDICINE

## 2017-09-11 PROCEDURE — 4A023N7 MEASUREMENT OF CARDIAC SAMPLING AND PRESSURE, LEFT HEART, PERCUTANEOUS APPROACH: ICD-10-PCS | Performed by: INTERNAL MEDICINE

## 2017-09-11 PROCEDURE — 25010000002 MIDAZOLAM PER 1 MG: Performed by: INTERNAL MEDICINE

## 2017-09-11 PROCEDURE — 92933 PRQ TRLML C ATHRC ST ANGIOP1: CPT | Performed by: INTERNAL MEDICINE

## 2017-09-11 PROCEDURE — C1724 CATH, TRANS ATHEREC,ROTATION: HCPCS

## 2017-09-11 PROCEDURE — 99233 SBSQ HOSP IP/OBS HIGH 50: CPT | Performed by: INTERNAL MEDICINE

## 2017-09-11 PROCEDURE — B221Z2Z COMPUTERIZED TOMOGRAPHY (CT SCAN) OF MULTIPLE CORONARY ARTERIES USING INTRAVASCULAR OPTICAL COHERENCE: ICD-10-PCS | Performed by: INTERNAL MEDICINE

## 2017-09-11 PROCEDURE — C9602 PERC D-E COR STENT ATHER S: HCPCS | Performed by: INTERNAL MEDICINE

## 2017-09-11 PROCEDURE — 93010 ELECTROCARDIOGRAM REPORT: CPT | Performed by: INTERNAL MEDICINE

## 2017-09-11 PROCEDURE — B2111ZZ FLUOROSCOPY OF MULTIPLE CORONARY ARTERIES USING LOW OSMOLAR CONTRAST: ICD-10-PCS | Performed by: INTERNAL MEDICINE

## 2017-09-11 PROCEDURE — C1753 CATH, INTRAVAS ULTRASOUND: HCPCS | Performed by: INTERNAL MEDICINE

## 2017-09-11 PROCEDURE — 82962 GLUCOSE BLOOD TEST: CPT

## 2017-09-11 DEVICE — XIENCE ALPINE EVEROLIMUS ELUTING CORONARY STENT SYSTEM 4.00 MM X 28 MM / RAPID-EXCHANGE
Type: IMPLANTABLE DEVICE | Status: FUNCTIONAL
Brand: XIENCE ALPINE

## 2017-09-11 RX ORDER — PRASUGREL 5 MG/1
TABLET, FILM COATED ORAL AS NEEDED
Status: DISCONTINUED | OUTPATIENT
Start: 2017-09-11 | End: 2017-09-11 | Stop reason: HOSPADM

## 2017-09-11 RX ORDER — LIDOCAINE HYDROCHLORIDE 10 MG/ML
INJECTION, SOLUTION INFILTRATION; PERINEURAL AS NEEDED
Status: DISCONTINUED | OUTPATIENT
Start: 2017-09-11 | End: 2017-09-11 | Stop reason: HOSPADM

## 2017-09-11 RX ORDER — PHENYLEPHRINE HCL IN 0.9% NACL 0.5 MG/5ML
SYRINGE (ML) INTRAVENOUS AS NEEDED
Status: DISCONTINUED | OUTPATIENT
Start: 2017-09-11 | End: 2017-09-11 | Stop reason: HOSPADM

## 2017-09-11 RX ORDER — NICARDIPINE HYDROCHLORIDE 2.5 MG/ML
INJECTION INTRAVENOUS AS NEEDED
Status: DISCONTINUED | OUTPATIENT
Start: 2017-09-11 | End: 2017-09-11 | Stop reason: HOSPADM

## 2017-09-11 RX ORDER — FENTANYL CITRATE 50 UG/ML
INJECTION, SOLUTION INTRAMUSCULAR; INTRAVENOUS AS NEEDED
Status: DISCONTINUED | OUTPATIENT
Start: 2017-09-11 | End: 2017-09-11 | Stop reason: HOSPADM

## 2017-09-11 RX ORDER — SODIUM CHLORIDE 9 MG/ML
1.5 INJECTION, SOLUTION INTRAVENOUS CONTINUOUS
Status: ACTIVE | OUTPATIENT
Start: 2017-09-11 | End: 2017-09-11

## 2017-09-11 RX ORDER — NITROGLYCERIN 5 MG/ML
INJECTION, SOLUTION INTRAVENOUS AS NEEDED
Status: DISCONTINUED | OUTPATIENT
Start: 2017-09-11 | End: 2017-09-11 | Stop reason: HOSPADM

## 2017-09-11 RX ORDER — MIDAZOLAM HYDROCHLORIDE 1 MG/ML
INJECTION INTRAMUSCULAR; INTRAVENOUS AS NEEDED
Status: DISCONTINUED | OUTPATIENT
Start: 2017-09-11 | End: 2017-09-11 | Stop reason: HOSPADM

## 2017-09-11 RX ORDER — HEPARIN SODIUM 1000 [USP'U]/ML
INJECTION, SOLUTION INTRAVENOUS; SUBCUTANEOUS AS NEEDED
Status: DISCONTINUED | OUTPATIENT
Start: 2017-09-11 | End: 2017-09-11 | Stop reason: HOSPADM

## 2017-09-11 RX ADMIN — INSULIN LISPRO 2 UNITS: 100 INJECTION, SOLUTION INTRAVENOUS; SUBCUTANEOUS at 21:29

## 2017-09-11 RX ADMIN — INSULIN LISPRO 2 UNITS: 100 INJECTION, SOLUTION INTRAVENOUS; SUBCUTANEOUS at 08:48

## 2017-09-11 RX ADMIN — AMLODIPINE BESYLATE 5 MG: 5 TABLET ORAL at 08:49

## 2017-09-11 RX ADMIN — ATORVASTATIN CALCIUM 40 MG: 40 TABLET, FILM COATED ORAL at 21:28

## 2017-09-11 RX ADMIN — LISINOPRIL 10 MG: 10 TABLET ORAL at 21:28

## 2017-09-11 RX ADMIN — LISINOPRIL 10 MG: 10 TABLET ORAL at 08:49

## 2017-09-11 RX ADMIN — CLOPIDOGREL BISULFATE 75 MG: 75 TABLET ORAL at 08:49

## 2017-09-11 RX ADMIN — PANTOPRAZOLE SODIUM 40 MG: 40 TABLET, DELAYED RELEASE ORAL at 06:17

## 2017-09-11 RX ADMIN — CARVEDILOL 25 MG: 12.5 TABLET, FILM COATED ORAL at 21:28

## 2017-09-11 RX ADMIN — ASPIRIN 81 MG: 81 TABLET, COATED ORAL at 08:49

## 2017-09-11 RX ADMIN — CARVEDILOL 25 MG: 12.5 TABLET, FILM COATED ORAL at 08:48

## 2017-09-11 RX ADMIN — CETIRIZINE HYDROCHLORIDE 10 MG: 10 TABLET, FILM COATED ORAL at 08:49

## 2017-09-11 NOTE — PLAN OF CARE
Problem: Patient Care Overview (Adult)  Goal: Plan of Care Review  Outcome: Ongoing (interventions implemented as appropriate)    09/11/17 0450   Coping/Psychosocial Response Interventions   Plan Of Care Reviewed With patient   Patient Care Overview   Progress progress toward functional goals as expected   Outcome Evaluation   Outcome Summary/Follow up Plan Pt continues to complain of Chest pain 1/10.          Problem: Acute Coronary Syndrome (ACS) (Adult)  Goal: Signs and Symptoms of Listed Potential Problems Will be Absent or Manageable (Acute Coronary Syndrome)  Outcome: Ongoing (interventions implemented as appropriate)    09/11/17 0450   Acute Coronary Syndrome (ACS)   Problems Assessed (Acute Coronary Syndrome (ACS)) all   Problems Present (Acute Coronary Syndrome (ACS)) chest pain (angina)

## 2017-09-11 NOTE — PROGRESS NOTES
"      HOSPITALIST DAILY PROGRESS NOTE    Chief Complaint: chest pain    Subjective   SUBJECTIVE/OVERNIGHT EVENTS   Patient states that he continues to have chest pain substernal, non-radiating, worse with exertion, denies any SOA    Review of Systems:  Gen-no fevers, no chills  CV-+chest pain, no palpitations  Resp-no cough, no dyspnea  GI-no N/V/D, no abd pain    Otherwise complete ROS is negative except as mentioned in the HPI.    Objective   OBJECTIVE   I have reviewed the vital signs.  /70 (BP Location: Right arm, Patient Position: Lying)  Pulse 62  Temp 98.3 °F (36.8 °C) (Oral)   Resp 18  Ht 69.5\" (176.5 cm)  Wt 222 lb (101 kg)  SpO2 92%  BMI 32.31 kg/m2    Physical Exam:  Gen-no acute distress, uncomfortable, laying in bed.  CV-RRR, S1 S2 normal, no m/r/g  Resp-CTAB, no wheezes  Abd-obese, soft, NT, ND, +BS  Ext-no edema  Neuro-A&Ox3, no focal deficits  Psych-appropriate mood and affect    Results:  I have reviewed the labs      Results from last 7 days  Lab Units 09/09/17  0857 09/08/17  1821   WBC 10*3/mm3 5.87 6.32   HEMOGLOBIN g/dL 14.3 14.0   HEMATOCRIT % 42.8 42.3   PLATELETS 10*3/mm3 168 173       Results from last 7 days  Lab Units 09/09/17  0857   SODIUM mmol/L 138   POTASSIUM mmol/L 4.0   CHLORIDE mmol/L 105   CO2 mmol/L 28.0   BUN mg/dL 15   CREATININE mg/dL 1.00   GLUCOSE mg/dL 151*   CALCIUM mg/dL 9.5       I have reviewed the medications.    Assessment/Plan   ASSESSMENT/PLAN    Principal Problem:    Unstable angina  Active Problems:    Coronary artery disease involving native coronary artery of native heart with angina pectoris    Type 2 diabetes mellitus    Hyperlipidemia LDL goal <70    Essential hypertension    Chest pain    Mr. Alvarez is a 71 yo WM w/ PMH of CAD s/p recent LHC with stent placement and known residual stenosis (8/28/17) who presents with recurrent substernal sided chest pain.  We were asked to admit for Cardiology.      Plan:  --Cardiology consulted, will " re-evaluate today for possible LHC, continue ASA and Plavix for now  --troponins negative thus far and EKG unremarkable  -- resume home rx      Dispo: anticipate d/c in 1-2 days    Mingo Laguna MD  09/11/17  7:30 AM

## 2017-09-11 NOTE — PROGRESS NOTES
"[facility] at [facility] Progress Note     LOS: 1 day   Patient Care Team:  Kedar Kuhn MD as PCP - General (Internal Medicine)  Kendall Andino MD as PCP - Family Medicine (Internal Medicine)  PCP:  Kedar Kuhn MD    Chief Complaint:  cp    SUBJECTIVE:   PT lying in bed, continues to have baseline CP 3/10 pressure radiating to jaw. No dyspnea, diaphoresis, nausea, palpitations, dizziness. CP worsens with exertion. States pain is same, though less severe, as when he had his MI.      Review of Systems:   All systems have been reviewed and are negative with the exception of those mentioned above.      OBJECTIVE:    Vital Sign Min/Max for last 24 hours  Temp  Min: 98.3 °F (36.8 °C)  Max: 98.8 °F (37.1 °C)   BP  Min: 107/70  Max: 130/92   Pulse  Min: 62  Max: 75   Resp  Min: 18  Max: 18   SpO2  Min: 92 %  Max: 95 %   No Data Recorded   No Data Recorded     Flowsheet Rows         First Filed Value    Admission Height  69.5\" (176.5 cm) Documented at 09/08/2017 1803    Admission Weight  224 lb (102 kg) Documented at 09/08/2017 1803          Telemetry: sr      Intake/Output Summary (Last 24 hours) at 09/11/17 0751  Last data filed at 09/10/17 1600   Gross per 24 hour   Intake                0 ml   Output              950 ml   Net             -950 ml     Intake & Output (last 3 days)       09/08 0701 - 09/09 0700 09/09 0701 - 09/10 0700 09/10 0701 - 09/11 0700 09/11 0701 - 09/12 0700    Urine (mL/kg/hr) 250 1750 (0.7) 950 (0.4)     Total Output 250 1750 950      Net -250 -1750 -950              Unmeasured Urine Occurrence 1 x 150 x             Physical Exam:  Physical Exam   Constitutional: He is oriented to person, place, and time. He appears well-developed and well-nourished. No distress.   Cardiovascular: Normal rate, regular rhythm, normal heart sounds and intact distal pulses.    No murmur heard.  Pulses:       Radial pulses are 2+ on the right side, and 2+ on the left side.      "   Dorsalis pedis pulses are 2+ on the right side, and 2+ on the left side.        Posterior tibial pulses are 2+ on the right side, and 2+ on the left side.   Pulmonary/Chest: Effort normal and breath sounds normal. He has no wheezes. He has no rales.   Abdominal: Soft. Bowel sounds are normal. There is no tenderness. There is no guarding.   Musculoskeletal: He exhibits no edema or tenderness.   Neurological: He is alert and oriented to person, place, and time.   Skin: Skin is warm and dry. No rash noted.   Nursing note and vitals reviewed.       LABS/DIAGNOSTIC DATA:    Results from last 7 days  Lab Units 09/09/17  0857 09/08/17  1821   WBC 10*3/mm3 5.87 6.32   HEMOGLOBIN g/dL 14.3 14.0   HEMATOCRIT % 42.8 42.3   PLATELETS 10*3/mm3 168 173     No results found for: TROPONINT        Results from last 7 days  Lab Units 09/09/17  0857 09/08/17  1821   SODIUM mmol/L 138 138   POTASSIUM mmol/L 4.0 4.2   CHLORIDE mmol/L 105 105   CO2 mmol/L 28.0 25.0   BUN mg/dL 15 16   CREATININE mg/dL 1.00 1.00   CALCIUM mg/dL 9.5 9.6   BILIRUBIN mg/dL 0.6 0.4   ALK PHOS U/L 55 55   ALT (SGPT) U/L 41* 40   AST (SGOT) U/L 30 30   GLUCOSE mg/dL 151* 112*       Results from last 7 days  Lab Units 09/09/17  0857   HEMOGLOBIN A1C % 6.40*       Results from last 7 days  Lab Units 09/09/17  0857   CHOLESTEROL mg/dL 126   TRIGLYCERIDES mg/dL 285*   HDL CHOL mg/dL 27*       Results from last 7 days  Lab Units 09/09/17  0857   TSH mIU/mL 3.715       Results from last 7 days  Lab Units 09/08/17  1821   BNP pg/mL 18.0       Medication Review:     amLODIPine 5 mg Oral Q24H   aspirin 324 mg Oral Once   aspirin 81 mg Oral Daily   atorvastatin 40 mg Oral Nightly   carvedilol 25 mg Oral Q12H   cetirizine 10 mg Oral Daily   clopidogrel 75 mg Oral Daily   insulin lispro 0-7 Units Subcutaneous 4x Daily AC & at Bedtime   lisinopril 10 mg Oral Q12H   pantoprazole 40 mg Oral Q AM   polyethylene glycol 17 g Oral Daily            Principal Problem:    Unstable  angina  Active Problems:    Coronary artery disease involving native coronary artery of native heart with angina pectoris    Type 2 diabetes mellitus    Hyperlipidemia LDL goal <70    Essential hypertension    Chest pain      ASSESSMENT/PLAN:  1. CAD with chest pain   - recent NSTEMI with EES to mid RCA, normal LVEF  - 70% stenosis in diagonal branch unchanged from 2015  - patient presents with recurrent angina, troponins negative x3, EKG no acute ischemic changes  - on DAPT and statin   - NPO since midnight, plan for [facility] with [facility] today      2. HTN  - controlled      3. HLD  - re-start Lipitor      4. DM2  - per hospitalists       Jennifer Schwartz PA-C  09/11/17  7:51 AM

## 2017-09-12 VITALS
HEIGHT: 69 IN | DIASTOLIC BLOOD PRESSURE: 69 MMHG | SYSTOLIC BLOOD PRESSURE: 107 MMHG | BODY MASS INDEX: 32.33 KG/M2 | WEIGHT: 218.3 LBS | HEART RATE: 83 BPM | TEMPERATURE: 98.4 F | OXYGEN SATURATION: 93 % | RESPIRATION RATE: 16 BRPM

## 2017-09-12 LAB
ACT BLD: 169 SECONDS (ref 82–152)
ACT BLD: 186 SECONDS (ref 82–152)
ANION GAP SERPL CALCULATED.3IONS-SCNC: 9 MMOL/L (ref 3–11)
BUN BLD-MCNC: 16 MG/DL (ref 9–23)
BUN/CREAT SERPL: 17.8 (ref 7–25)
CALCIUM SPEC-SCNC: 9 MG/DL (ref 8.7–10.4)
CHLORIDE SERPL-SCNC: 101 MMOL/L (ref 99–109)
CO2 SERPL-SCNC: 26 MMOL/L (ref 20–31)
CREAT BLD-MCNC: 0.9 MG/DL (ref 0.6–1.3)
GFR SERPL CREATININE-BSD FRML MDRD: 83 ML/MIN/1.73
GLUCOSE BLD-MCNC: 178 MG/DL (ref 70–100)
GLUCOSE BLDC GLUCOMTR-MCNC: 177 MG/DL (ref 70–130)
GLUCOSE BLDC GLUCOMTR-MCNC: 209 MG/DL (ref 70–130)
POTASSIUM BLD-SCNC: 3.9 MMOL/L (ref 3.5–5.5)
SODIUM BLD-SCNC: 136 MMOL/L (ref 132–146)

## 2017-09-12 PROCEDURE — 99239 HOSP IP/OBS DSCHRG MGMT >30: CPT | Performed by: NURSE PRACTITIONER

## 2017-09-12 PROCEDURE — 25010000002 INFLUENZA VAC SPLIT QUAD 0.5 ML SUSPENSION PREFILLED SYRINGE: Performed by: INTERNAL MEDICINE

## 2017-09-12 PROCEDURE — G0008 ADMIN INFLUENZA VIRUS VAC: HCPCS | Performed by: INTERNAL MEDICINE

## 2017-09-12 PROCEDURE — 82962 GLUCOSE BLOOD TEST: CPT

## 2017-09-12 PROCEDURE — 90674 CCIIV4 VAC NO PRSV 0.5 ML IM: CPT | Performed by: INTERNAL MEDICINE

## 2017-09-12 PROCEDURE — 80048 BASIC METABOLIC PNL TOTAL CA: CPT | Performed by: INTERNAL MEDICINE

## 2017-09-12 PROCEDURE — G0378 HOSPITAL OBSERVATION PER HR: HCPCS

## 2017-09-12 PROCEDURE — 99232 SBSQ HOSP IP/OBS MODERATE 35: CPT | Performed by: INTERNAL MEDICINE

## 2017-09-12 RX ADMIN — CETIRIZINE HYDROCHLORIDE 10 MG: 10 TABLET, FILM COATED ORAL at 08:43

## 2017-09-12 RX ADMIN — INSULIN LISPRO 3 UNITS: 100 INJECTION, SOLUTION INTRAVENOUS; SUBCUTANEOUS at 12:36

## 2017-09-12 RX ADMIN — CLOPIDOGREL BISULFATE 75 MG: 75 TABLET ORAL at 08:43

## 2017-09-12 RX ADMIN — CARVEDILOL 25 MG: 12.5 TABLET, FILM COATED ORAL at 08:44

## 2017-09-12 RX ADMIN — PANTOPRAZOLE SODIUM 40 MG: 40 TABLET, DELAYED RELEASE ORAL at 05:46

## 2017-09-12 RX ADMIN — INSULIN LISPRO 2 UNITS: 100 INJECTION, SOLUTION INTRAVENOUS; SUBCUTANEOUS at 08:45

## 2017-09-12 RX ADMIN — INFLUENZA VIRUS VACCINE 0.5 ML: 15; 15; 15; 15 SUSPENSION INTRAMUSCULAR at 11:48

## 2017-09-12 RX ADMIN — ASPIRIN 81 MG: 81 TABLET, COATED ORAL at 08:42

## 2017-09-12 NOTE — DISCHARGE SUMMARY
[facility] Medicine Services  DISCHARGE SUMMARY       Date of Admission: 9/8/2017  Date of Discharge:  9/12/2017  Primary Care Physician: Kedar Kuhn MD  Consulting Physician(s)     Provider Relationship    Iron Colunga MD Consulting Physician          Discharge Diagnoses:  Active Hospital Problems (** Indicates Principal Problem)    Diagnosis Date Noted   • **Coronary artery disease involving native coronary artery of native heart with unstable angina pectoris [I25.110] 08/27/2017     · Cardiac catheterization (2009):  diffuse non-obstructive plaque and normal LVEF.  · Lexiscan Cardiolite (5/27/2015):  small-sized mildly reversible basolateral defect and EF of 61%, normal electrocardiographic and hemodynamic response to Lexiscan.  · Cardiac catheterization (5/28/2015):  Moderate, non-occlusive CAD.  LVEF 65%.  · Cardiac catheterization for NSTEMI (8/28/2017): Culprit was a eccentric 90% stenosis of the mid RCA status post PCI with a Xience 4 x 38 mm drug-eluting stent.  A stable 70% stenosis of the first diagonal branch also present.  Normal LVEF.     • Type 2 diabetes mellitus [E11.9] 08/27/2017   • Hyperlipidemia LDL goal <70 [E78.5] 08/27/2017   • Essential hypertension [I10] 08/27/2017      Resolved Hospital Problems    Diagnosis Date Noted Date Resolved   • Unstable angina [I20.0] 09/08/2017 09/11/2017   • Chest pain [R07.9] 09/08/2017 09/11/2017       Presenting Problem/History of Present Illness  Chest pain, unspecified type [R07.9]  Chest pain, unspecified type [R07.9]     Chief Complaint on Day of Discharge: No chest pain    History of Present Illness on Day of Discharge: Sitting up in chair, ready to go home, no chest pain, no shortness of air, no abdominal pain, reports normal bowel movement.    Hospital Course  Patient is a 72 y.o. male with past medical history significant for diabetes mellitus, hypertension, hyperlipidemia, and CAD status post RCA stent on 8/28/17  with residual stenosis who presented to the emergency department with chest pain.  Patient has continued to have chest pain since his left heart catheter that was becoming more consistent and increasing in intensity.  He was admitted to the hospital service with cardiology consultation    Troponins were negative and EKG showed no acute ischemic changes.  He did undergo a left heart catheter on 9/11 that showed severe disease with focal stenosis within recently placed RCA stent and unchanged disease of his first diagonal.  He had a CT guided rotational arthrectomy of the mid RCA with placement of a drug-eluting stent.  He was noted to have a normal ejection fraction.    Cardiology recommends to continue dual antiplatelet therapy as well as statin, ACE inhibitor, and blocker.  His blood pressures been controlled this admission.  His diabetes is well controlled at home with a hemoglobin A1c of 6.8.  He is to resume his home diabetes medication.    Procedures Performed  Procedure(s):  Left Heart Cath  Optical Coherent Tomography  Atherectomy-coronary  Stent ROBERTO coronary       Consults:   Consults     Date and Time Order Name Status Description    9/8/2017 2342 Inpatient Consult to Cardiology Completed     8/27/2017 1914 Inpatient Consult to Cardiology Completed           Pertinent Test Results:  Imaging Results (last 7 days)     Procedure Component Value Units Date/Time    XR Chest 1 View [[id number]]  (Abnormal) Collected:  09/08/17 1817     Updated:  09/08/17 1957    Narrative:       EXAM:    XR Chest, 1 View    EXAM DATE/TIME:    9/8/2017 6:17 PM    CLINICAL HISTORY:    72 years old, male; Pain; Chest pain; Left-sided chest pain; Prior surgery;   Surgery date: <1 month; Surgery type: Cardiac stent 8/28/17; Additional info:   Chest pain triage protocol    TECHNIQUE:    Frontal view of the chest.    COMPARISON:    CR - XR CHEST 1 VIEW PORTABLE 5/26/2015 10:59:52 AM.    FINDINGS:    Lungs:  No significant pulmonary  vascular congestion.  No focal pulmonary   consolidation.    Pleural space:  No pleural effusion or pneumothorax.    Mediastinum:  The cardiomediastinal silhouette is borderline in size.  There   may be an underlying hiatal hernia.    Bones/joints:  Old left rib fractures again seen.  No evidence of acute   osseous abnormality.    Upper abdomen:  Left upper abdominal region surgical clips seen.      Impression:         No evidence of acute cardiopulmonary abnormality radiographically. Findings   as described.    THIS DOCUMENT HAS BEEN ELECTRONICALLY SIGNED BY ANTHONY OCHOA MD        Lab Results (last 7 days)     Procedure Component Value Units Date/Time    CBC & Differential [[id number]] Collected:  09/08/17 1821    Specimen:  Blood Updated:  09/08/17 1837    Narrative:       The following orders were created for panel order CBC & Differential.  Procedure                               Abnormality         Status                     ---------                               -----------         ------                     CBC Auto Differential[[id number]]        Normal              Final result                 Please view results for these tests on the individual orders.    CBC Auto Differential [[id number]]  (Normal) Collected:  09/08/17 1821    Specimen:  Blood Updated:  09/08/17 1837     WBC 6.32 10*3/mm3      RBC 4.98 10*6/mm3      Hemoglobin 14.0 g/dL      Hematocrit 42.3 %      MCV 84.9 fL      MCH 28.1 pg      MCHC 33.1 g/dL      RDW 13.5 %      RDW-SD 41.7 fl      MPV 9.9 fL      Platelets 173 10*3/mm3      Neutrophil % 60.9 %      Lymphocyte % 26.3 %      Monocyte % 9.2 %      Eosinophil % 2.5 %      Basophil % 0.6 %      Immature Grans % 0.5 %      Neutrophils, Absolute 3.85 10*3/mm3      Lymphocytes, Absolute 1.66 10*3/mm3      Monocytes, Absolute 0.58 10*3/mm3      Eosinophils, Absolute 0.16 10*3/mm3      Basophils, Absolute 0.04 10*3/mm3      Immature Grans, Absolute 0.03 10*3/mm3     POC Troponin, Rapid  [[id number]]  (Normal) Collected:  09/08/17 1830    Specimen:  Blood Updated:  09/08/17 1845     Troponin I 0.00 ng/mL       Serial Number: [id number]:  [id number]       Comprehensive Metabolic Panel [[id number]]  (Abnormal) Collected:  09/08/17 1821    Specimen:  Blood Updated:  09/08/17 1905     Glucose 112 (H) mg/dL      BUN 16 mg/dL      Creatinine 1.00 mg/dL      Sodium 138 mmol/L      Potassium 4.2 mmol/L      Chloride 105 mmol/L      CO2 25.0 mmol/L      Calcium 9.6 mg/dL      Total Protein 7.6 g/dL      Albumin 4.60 g/dL      ALT (SGPT) 40 U/L      AST (SGOT) 30 U/L      Alkaline Phosphatase 55 U/L      Total Bilirubin 0.4 mg/dL      eGFR Non African Amer 74 mL/min/1.73      Globulin 3.0 gm/dL      A/G Ratio 1.5 g/dL      BUN/Creatinine Ratio 16.0     Anion Gap 8.0 mmol/L     Narrative:       National Kidney Foundation Guidelines    Stage     Description        GFR  1         Normal or High     90+  2         Mild decrease      60-89  3         Moderate decrease  30-59  4         Severe decrease    15-29  5         Kidney failure     <15    Lipase [[id number]]  (Normal) Collected:  09/08/17 1821    Specimen:  Blood Updated:  09/08/17 1905     Lipase 46 U/L     BNP [[id number]]  (Normal) Collected:  09/08/17 1821    Specimen:  Blood Updated:  09/08/17 1911     BNP 18.0 pg/mL     Light Blue Top [[id number]] Collected:  09/08/17 1821    Specimen:  Blood Updated:  09/08/17 2001     Extra Tube hold for add-on      Auto resulted       Green Top (Gel) [[id number]] Collected:  09/08/17 1821    Specimen:  Blood Updated:  09/08/17 2001     Extra Tube Hold for add-ons.      Auto resulted.       Lavender Top [[id number]] Collected:  09/08/17 1821    Specimen:  Blood Updated:  09/08/17 2001     Extra Tube hold for add-on      Auto resulted       Gold Top - SST [[id number]] Collected:  09/08/17 1821    Specimen:  Blood Updated:  09/08/17 2001     Extra Tube Hold for add-ons.      Auto resulted.       POC Troponin, Rapid  [[id number]]  (Normal) Collected:  09/08/17 2047    Specimen:  Blood Updated:  09/08/17 2105     Troponin I 0.00 ng/mL       Serial Number: [id number]:  [id number]       POC Glucose Fingerstick [[id number]]  (Normal) Collected:  09/08/17 2355    Specimen:  Blood Updated:  09/08/17 2357     Glucose 105 mg/dL     Narrative:       Meter: [id number] : [id number] Adrienne James    Troponin [[id number]]  (Normal) Collected:  09/09/17 0004    Specimen:  Blood Updated:  09/09/17 0111     Troponin I <0.006 ng/mL     POC Glucose Fingerstick [[id number]]  (Abnormal) Collected:  09/09/17 0712    Specimen:  Blood Updated:  09/09/17 0713     Glucose 158 (H) mg/dL     Narrative:       Meter: [id number] : [id number] Leblanc Michelle    [place] Draw [[id number]] Collected:  09/08/17 1821    Specimen:  Blood Updated:  09/09/17 0810    Narrative:       The following orders were created for panel order [place] Draw.  Procedure                               Abnormality         Status                     ---------                               -----------         ------                     Light Blue Top[[id number]]                                   Final result               Green Top (Gel)[[id number]]                                  Final result               Lavender Top[[id number]]                                     Final result               Gold Top - SST[[id number]]                                   Final result               Green Top (No Gel)[[id number]]                                                            Please view results for these tests on the individual orders.    CBC Auto Differential [[id number]]  (Abnormal) Collected:  09/09/17 0857    Specimen:  Blood Updated:  09/09/17 0923     WBC 5.87 10*3/mm3      RBC 4.99 10*6/mm3      Hemoglobin 14.3 g/dL      Hematocrit 42.8 %      MCV 85.8 fL      MCH 28.7 pg      MCHC 33.4 g/dL      RDW 13.6 %      RDW-SD 42.6 fl      MPV 10.0 fL      Platelets 168 10*3/mm3      Neutrophil  % 67.1 %      Lymphocyte % 21.1 (L) %      Monocyte % 8.3 %      Eosinophil % 2.7 %      Basophil % 0.3 %      Immature Grans % 0.5 %      Neutrophils, Absolute 3.93 10*3/mm3      Lymphocytes, Absolute 1.24 10*3/mm3      Monocytes, Absolute 0.49 10*3/mm3      Eosinophils, Absolute 0.16 10*3/mm3      Basophils, Absolute 0.02 10*3/mm3      Immature Grans, Absolute 0.03 10*3/mm3     Hemoglobin A1c [[id number]]  (Abnormal) Collected:  09/09/17 0857    Specimen:  Blood Updated:  09/09/17 0932     Hemoglobin A1C 6.40 (H) %     Narrative:       The American Diabetes Association recommends maintenance of Hemoglobin A1C at 7.0% or lower. Goals for Hemoglobin A1C reduction may need to be modified if hypoglycemia is a problem.    Comprehensive Metabolic Panel [[id number]]  (Abnormal) Collected:  09/09/17 0857    Specimen:  Blood Updated:  09/09/17 1010     Glucose 151 (H) mg/dL      BUN 15 mg/dL      Creatinine 1.00 mg/dL      Sodium 138 mmol/L      Potassium 4.0 mmol/L      Chloride 105 mmol/L      CO2 28.0 mmol/L      Calcium 9.5 mg/dL      Total Protein 7.6 g/dL      Albumin 4.50 g/dL      ALT (SGPT) 41 (H) U/L      AST (SGOT) 30 U/L      Alkaline Phosphatase 55 U/L      Total Bilirubin 0.6 mg/dL      eGFR Non African Amer 74 mL/min/1.73      Globulin 3.1 gm/dL      A/G Ratio 1.5 g/dL      BUN/Creatinine Ratio 15.0     Anion Gap 5.0 mmol/L     Narrative:       National Kidney Foundation Guidelines    Stage     Description        GFR  1         Normal or High     90+  2         Mild decrease      60-89  3         Moderate decrease  30-59  4         Severe decrease    15-29  5         Kidney failure     <15    Lipid Panel [[id number]]  (Abnormal) Collected:  09/09/17 0857    Specimen:  Blood Updated:  09/09/17 1010     Total Cholesterol 126 mg/dL      Triglycerides 285 (H) mg/dL      HDL Cholesterol 27 (L) mg/dL      LDL Cholesterol  64 mg/dL     Narrative:       Cholesterol Reference Ranges:   Desirable       < 200 mg/dL    Borderline    200-239 mg/dL   High Risk       > 239 mg/dL    Triglyceride Reference Ranges:   Normal          < 150 mg/dL   Borderline    150-199 mg/dL   High          200-499 mg/dL   Very High       > 499 mg/dL    HDL Reference Ranges:   Low              < 40 mg/dL   High             > 59 mg/dL    LDL Reference Ranges:   Optimal         < 100 mg/dL   Near Optimal  100-129 mg/dL   Borderline    130-159 mg/dL   High          160-189 mg/dL   Very High       > 189 mg/dL    TSH [[id number]]  (Normal) Collected:  09/09/17 0857    Specimen:  Blood Updated:  09/09/17 1013     TSH 3.715 mIU/mL     POC Glucose Fingerstick [[id number]]  (Abnormal) Collected:  09/09/17 1133    Specimen:  Blood Updated:  09/09/17 1135     Glucose 153 (H) mg/dL     Narrative:       Meter: [id number] : [id number] Leblanc Michelle    POC Glucose Fingerstick [[id number]]  (Abnormal) Collected:  09/09/17 1615    Specimen:  Blood Updated:  09/09/17 1617     Glucose 144 (H) mg/dL     Narrative:       Meter: [id number] : [id number] Leblanc Michelle    POC Glucose Fingerstick [[id number]]  (Normal) Collected:  09/09/17 2123    Specimen:  Blood Updated:  09/09/17 2125     Glucose 130 mg/dL     Narrative:       Meter: [id number] : [id number] [facility]    POC Glucose Fingerstick [[id number]]  (Abnormal) Collected:  09/10/17 0708    Specimen:  Blood Updated:  09/10/17 0709     Glucose 179 (H) mg/dL     Narrative:       Meter: [id number] : [id number] Singspiel    POC Glucose Fingerstick [[id number]]  (Abnormal) Collected:  09/10/17 1118    Specimen:  Blood Updated:  09/10/17 1120     Glucose 169 (H) mg/dL     Narrative:       Meter: [id number] : [id number] Singspiel    POC Glucose Fingerstick [[id number]]  (Abnormal) Collected:  09/10/17 1628    Specimen:  Blood Updated:  09/10/17 1630     Glucose 150 (H) mg/dL     Narrative:       Meter: [id number] : [id number] Brad Bazan    POC Glucose Fingerstick [[id number]]  (Abnormal)  Collected:  09/10/17 2116    Specimen:  Blood Updated:  09/10/17 2117     Glucose 137 (H) mg/dL     Narrative:       Meter: [id number] : [id number] Dean Lubin    POC Glucose Fingerstick [[id number]]  (Abnormal) Collected:  09/11/17 0715    Specimen:  Blood Updated:  09/11/17 0716     Glucose 173 (H) mg/dL     Narrative:       Meter: [id number] : [id number] Brad Hortensia    POC Glucose Fingerstick [[id number]]  (Abnormal) Collected:  09/11/17 1326    Specimen:  Blood Updated:  09/11/17 1327     Glucose 173 (H) mg/dL     Narrative:       Meter: [id number] : [id number] Parth Roberts    POC Activated Clotting Time [[id number]]  (Abnormal) Collected:  09/11/17 1045    Specimen:  Blood Updated:  09/11/17 1905     Activated Clotting Time  318 (H) Seconds       Serial Number: [id number]:  [id number]       POC Activated Clotting Time [[id number]]  (Abnormal) Collected:  09/11/17 1114    Specimen:  Blood Updated:  09/11/17 1905     Activated Clotting Time  241 (H) Seconds       Serial Number: [id number]:  [id number]       POC Activated Clotting Time [[id number]]  (Abnormal) Collected:  09/11/17 1137    Specimen:  Blood Updated:  09/11/17 1905     Activated Clotting Time  246 (H) Seconds       Serial Number: [id number]:  [id number]       POC Activated Clotting Time [[id number]]  (Abnormal) Collected:  09/11/17 1159    Specimen:  Blood Updated:  09/11/17 1906     Activated Clotting Time  279 (H) Seconds       Serial Number: [id number]:  [id number]       POC Activated Clotting Time [[id number]]  (Abnormal) Collected:  09/11/17 1223    Specimen:  Blood Updated:  09/11/17 1906     Activated Clotting Time  318 (H) Seconds       Serial Number: [id number]:  [id number]       POC Glucose Fingerstick [[id number]]  (Abnormal) Collected:  09/11/17 2052    Specimen:  Blood Updated:  09/11/17 2055     Glucose 195 (H) mg/dL     Narrative:       Meter: [id number] : [id number] Beatty    POC Glucose Fingerstick  "[[id number]]  (Abnormal) Collected:  09/12/17 0719    Specimen:  Blood Updated:  09/12/17 0721     Glucose 177 (H) mg/dL     Narrative:       Meter: [id number] : [id number] [place] Laine    Basic Metabolic Panel [[id number]]  (Abnormal) Collected:  09/12/17 0654    Specimen:  Blood Updated:  09/12/17 0736     Glucose 178 (H) mg/dL      BUN 16 mg/dL      Creatinine 0.90 mg/dL      Sodium 136 mmol/L      Potassium 3.9 mmol/L      Chloride 101 mmol/L      CO2 26.0 mmol/L      Calcium 9.0 mg/dL      eGFR Non African Amer 83 mL/min/1.73      BUN/Creatinine Ratio 17.8     Anion Gap 9.0 mmol/L     Narrative:       National Kidney Foundation Guidelines    Stage     Description        GFR  1         Normal or High     90+  2         Mild decrease      60-89  3         Moderate decrease  30-59  4         Severe decrease    15-29  5         Kidney failure     <15    POC Glucose Fingerstick [[id number]]  (Abnormal) Collected:  09/12/17 1111    Specimen:  Blood Updated:  09/12/17 1113     Glucose 209 (H) mg/dL     Narrative:       Meter: [id number] : [id number] [place] Laine          Condition on Discharge: stable    Physical Exam on Discharge:/69  Pulse 83  Temp 98.4 °F (36.9 °C) (Oral)   Resp 16  Ht 69\" (175.3 cm)  Wt 218 lb 4.8 oz (99 kg)  SpO2 93%  BMI 32.24 kg/m2  Physical Exam   Constitutional: He is oriented to person, place, and time. He appears well-nourished. No distress.   HENT:   Head: Normocephalic and atraumatic.   Eyes: Pupils are equal, round, and reactive to light. No scleral icterus.   Neck: Neck supple. No tracheal deviation present.   Cardiovascular: Normal rate, regular rhythm and normal heart sounds.    Pulmonary/Chest: Effort normal and breath sounds normal.   Abdominal: Soft. Bowel sounds are normal.   Musculoskeletal: He exhibits no edema.   Neurological: He is alert and oriented to person, place, and time.   Skin: Skin is warm and dry.   Psychiatric: He has a normal mood and " affect. His behavior is normal.   Vitals reviewed.        Discharge Disposition  Home or Self Care    Discharge Medications   Zaid Alvarez   Home Medication Instructions VICENTE:[id number]    Printed on:09/12/17 7876   Medication Information                      amLODIPine (NORVASC) 5 MG tablet  Take 5 mg by mouth Daily As Needed. For SBP>140             ammonium lactate (AMLACTIN) 12 % cream  Apply 1 application topically As Needed for Dry Skin.             aspirin 81 MG EC tablet  Take 1 tablet by mouth Daily.             atorvastatin (LIPITOR) 40 MG tablet  Take 1 tablet by mouth Every Night.             budesonide-formoterol (SYMBICORT) 80-4.5 MCG/ACT inhaler  Inhale 2 puffs 2 (Two) Times a Day As Needed.             carvedilol (COREG) 25 MG tablet  Take 25 mg by mouth 2 (Two) Times a Day With Meals.             cetirizine (zyrTEC) 10 MG tablet  Take 10 mg by mouth Daily.             clopidogrel (PLAVIX) 75 MG tablet  Take 1 tablet by mouth Daily.             clotrimazole-betamethasone (LOTRISONE) 1-0.05 % cream  Apply  topically 2 (Two) Times a Day As Needed.             Empagliflozin (JARDIANCE) 10 MG tablet  Take 10 mg by mouth Daily.             famotidine (PEPCID) 10 MG tablet  Take 10 mg by mouth 2 (Two) Times a Day.             lisinopril (PRINIVIL,ZESTRIL) 10 MG tablet  Take 1 tablet by mouth 2 (Two) Times a Day.             metFORMIN (GLUCOPHAGE) 500 MG tablet  Take 500 mg by mouth 4 (Four) Times a Day.             montelukast (SINGULAIR) 10 MG tablet  Take 10 mg by mouth Every Night.             nateglinide (STARLIX) 120 MG tablet  Take 120 mg by mouth 3 (Three) Times a Day.             pantoprazole (PROTONIX) 40 MG EC tablet  Take 40 mg by mouth Daily.             polyethylene glycol (MIRALAX) packet  Take 17 g by mouth Daily As Needed.             promethazine (PHENERGAN) 25 MG tablet  Take 25 mg by mouth Every 6 (Six) Hours As Needed for Nausea or Vomiting.             SITagliptin (JANUVIA) 100 MG  tablet  Take 100 mg by mouth Daily.             terazosin (HYTRIN) 5 MG capsule  Take 5 mg by mouth Every Night.             vitamin B-12 (CYANOCOBALAMIN) 1000 MCG tablet  Take 1,000 mcg by mouth Every 30 (Thirty) Days.                 Discharge Diet: cardiac diabetic    Activity at Discharge: as tolerated    Follow-up Appointments  Future Appointments  Date Time Provider Department Center   10/13/2017 3:30 PM Kedar Mathews MD [facility] None            DARWIN Brandon 09/12/17 11:39 AM    Time: 33 minutes    Please note that portions of this note may have been completed with a voice recognition program. Efforts were made to edit the dictations, but occasionally words are mistranscribed.

## 2017-09-12 NOTE — PROGRESS NOTES
Discharge Planning Assessment  [facility]     Patient Name: Zaid Alvarez  MRN: [id number]  Today's Date: 9/12/2017    Admit Date: 9/8/2017          Discharge Needs Assessment       09/12/17 1150    Living Environment    Lives With spouse    Living Arrangements house    Home Accessibility stairs to enter home;stairs within home    Number of Stairs to Enter Home 3    Number of Stairs Within Home --   1 flight    Transportation Available car;family or friend will provide    Living Environment    Provides Primary Care For no one    Quality Of Family Relationships unable to assess    Able to Return to Prior Living Arrangements yes    Discharge Needs Assessment    Concerns To Be Addressed no discharge needs identified;denies needs/concerns at this time    Readmission Within The Last 30 Days no previous admission in last 30 days    Anticipated Changes Related to Illness none    Equipment Currently Used at Home none    Equipment Needed After Discharge none    Discharge Disposition home or self-care    Discharge Contact Information if Applicable Chantel Alvarez, spouse  [phone]  [phone]  Zaid Alvarez, son  [phone]            Discharge Plan       09/12/17 1152    Case Management/Social Work Plan    Plan Home    Patient/Family In Agreement With Plan yes    Additional Comments Spoke with patient at bedside regarding discharge planning.  Patient has used [facility] in the past after knee surgery both in 2008 and 2015.  Denies difficulty affording medications.  Patien lives wiht his wife in a multilevel home with 3 steps to access.  No discharge needs noted.  Patient to discharge today via car with family to transport.        Discharge Placement     No information found        Expected Discharge Date and Time     Expected Discharge Date Expected Discharge Time    Sep 12, 2017               Demographic Summary       09/12/17 1149    Referral Information    Admission Type observation    Arrived From home or  self-care    Referral Source admission list    Reason For Consult discharge planning    Record Reviewed clinical discipline documentation;history and physical;patient profile    Primary Care Physician Information    Name Kedar Kuhn MD            Functional Status       09/12/17 1149    Functional Status Current    Current Functional Level Comment Per Nursing Assessment    Functional Status Prior    Ambulation 0-->independent    Transferring 0-->independent    Toileting 0-->independent    Bathing 0-->independent    Dressing 0-->independent    Eating 0-->independent    Communication 0-->understands/communicates without difficulty    Swallowing 0-->swallows foods/liquids without difficulty    IADL    Medications independent    Meal Preparation independent    Housekeeping independent    Laundry independent    Shopping independent    Oral Care independent    Activity Tolerance    Current Activity Limitations none    Usual Activity Tolerance good    Current Activity Tolerance moderate    Employment/Financial    Employment/Finance Comments Medicare/[employer]            Psychosocial     None            Abuse/Neglect     None            Legal     None            Substance Abuse     None            Patient Forms     None          Susan Fuentes, RN

## 2017-09-12 NOTE — PROGRESS NOTES
"[facility] Progress Note     LOS: 1 day   Patient Care Team:  Kedar Kuhn MD as PCP - General (Internal Medicine)  Kendall Andino MD as PCP - Family Medicine (Internal Medicine)  PCP:  Kedar Kuhn MD    Chief Complaint:  cad    SUBJECTIVE:   Patient line of at, states that he is free from chest pain for the first time since his heart cath in 2015. Denies dyspnea, palpitations, dizziness, nausea.      Review of Systems:   All systems have been reviewed and are negative with the exception of those mentioned above.      OBJECTIVE:    Vital Sign Min/Max for last 24 hours  Temp  Min: 97.9 °F (36.6 °C)  Max: 98.4 °F (36.9 °C)   BP  Min: 82/54  Max: 154/93   Pulse  Min: 56  Max: 84   Resp  Min: 16  Max: 18   SpO2  Min: 91 %  Max: 98 %   Flow (L/min)  Min: 2  Max: 2   Weight  Min: 218 lb 4.8 oz (99 kg)  Max: 218 lb 4.8 oz (99 kg)     Flowsheet Rows         First Filed Value    Admission Height  69.5\" (176.5 cm) Documented at 09/08/2017 1803    Admission Weight  224 lb (102 kg) Documented at 09/08/2017 1803          Telemetry: sr      Intake/Output Summary (Last 24 hours) at 09/12/17 0738  Last data filed at 09/12/17 0425   Gross per 24 hour   Intake                0 ml   Output             1450 ml   Net            -1450 ml     Intake & Output (last 3 days)       09/09 0701 - 09/10 0700 09/10 0701 - 09/11 0700 09/11 0701 - 09/12 0700 09/12 0701 - 09/13 0700    Urine (mL/kg/hr) 1750 (0.7) 950 (0.4) 1450 (0.6)     Total Output 0966 262 6431      Net -1750 -950 -1450              Unmeasured Urine Occurrence 150 x              Physical Exam:  Physical Exam   Constitutional: He is oriented to person, place, and time. He appears well-developed and well-nourished. No distress.   Cardiovascular: Normal rate, regular rhythm, normal heart sounds and intact distal pulses.    No murmur heard.  Pulses:       Radial pulses are 2+ on the right side, and 2+ on the left side.        " Dorsalis pedis pulses are 2+ on the right side, and 2+ on the left side.        Posterior tibial pulses are 2+ on the right side, and 2+ on the left side.   Pulmonary/Chest: Effort normal and breath sounds normal. He has no wheezes. He has no rales.   Abdominal: Soft. Bowel sounds are normal. There is no tenderness. There is no guarding.   Musculoskeletal: He exhibits no edema or tenderness.   Neurological: He is alert and oriented to person, place, and time.   Skin: Skin is warm and dry. No rash noted.   Psychiatric: He has a normal mood and affect.   Nursing note and vitals reviewed.       LABS/DIAGNOSTIC DATA:    Results from last 7 days  Lab Units 09/09/17  0857 09/08/17  1821   WBC 10*3/mm3 5.87 6.32   HEMOGLOBIN g/dL 14.3 14.0   HEMATOCRIT % 42.8 42.3   PLATELETS 10*3/mm3 168 173     No results found for: TROPONINT        Results from last 7 days  Lab Units 09/12/17  0654 09/09/17  0857 09/08/17  1821   SODIUM mmol/L 136 138 138   POTASSIUM mmol/L 3.9 4.0 4.2   CHLORIDE mmol/L 101 105 105   CO2 mmol/L 26.0 28.0 25.0   BUN mg/dL 16 15 16   CREATININE mg/dL 0.90 1.00 1.00   CALCIUM mg/dL 9.0 9.5 9.6   BILIRUBIN mg/dL  --  0.6 0.4   ALK PHOS U/L  --  55 55   ALT (SGPT) U/L  --  41* 40   AST (SGOT) U/L  --  30 30   GLUCOSE mg/dL 178* 151* 112*       Results from last 7 days  Lab Units 09/09/17  0857   HEMOGLOBIN A1C % 6.40*       Results from last 7 days  Lab Units 09/09/17  0857   CHOLESTEROL mg/dL 126   TRIGLYCERIDES mg/dL 285*   HDL CHOL mg/dL 27*       Results from last 7 days  Lab Units 09/09/17  0857   TSH mIU/mL 3.715       Results from last 7 days  Lab Units 09/08/17  1821   BNP pg/mL 18.0       Medication Review:     amLODIPine 5 mg Oral Q24H   aspirin 324 mg Oral Once   aspirin 81 mg Oral Daily   atorvastatin 40 mg Oral Nightly   carvedilol 25 mg Oral Q12H   cetirizine 10 mg Oral Daily   clopidogrel 75 mg Oral Daily   insulin lispro 0-7 Units Subcutaneous 4x Daily AC & at Bedtime   lisinopril 10 mg Oral  Q12H   pantoprazole 40 mg Oral Q AM   polyethylene glycol 17 g Oral Daily            Principal Problem:    Coronary artery disease involving native coronary artery of native heart with unstable angina pectoris  Active Problems:    Type 2 diabetes mellitus    Hyperlipidemia LDL goal <70    Essential hypertension      ASSESSMENT/PLAN:  1. CAD with chest pain   - recent NSTEMI with EES to mid RCA, normal LVEF  - 70% stenosis in diagonal branch unchanged from 2015  - patient presents with recurrent angina, troponins negative x3, EKG no acute ischemic changes  - [facility] 9/11/17 HTR: severe dz with focal stenosis within recently placed RCA stent, unchanged dz of first diagonal. OCT guided rotational atherectomy of mid RCA with placement of ROBERTO, nl EF  -continue DAPT, statin, ACEi, BB      2. HTN  - controlled      3. HLD  - Lipitor      4. DM2  - per hospitalists     HOME TODAY      Jennifer Schwartz PA-C as scribe for dr macedo  09/12/17  7:38 AM  Idesiree md, personally performed the services described in this documentation as scribed by the above named individual in my presence, and it is both accurate and complete.  9/12/2017  10:30 AM

## 2017-09-12 NOTE — PROGRESS NOTES
Pt. Referred for Phase II Cardiac Rehab. Staff discussed benefits of exercise, program protocol, and educational material provided. Teach back verified.  Patient refused Cardiac Rehab at this time. Staff left brochure to [facility]. Pt. States he will call [facility] if he wishes to participate.

## (undated) DEVICE — PRESSURE MONITORING SET: Brand: TRUWAVE, VAMP

## (undated) DEVICE — DEV INFL MONARCH 25W

## (undated) DEVICE — NC TREK CORONARY DILATATION CATHETER 4.0 MM X 15 MM / RAPID-EXCHANGE: Brand: NC TREK

## (undated) DEVICE — GLIDESHEATH BASIC HYDROPHILIC COATED INTRODUCER SHEATH: Brand: GLIDESHEATH

## (undated) DEVICE — SLV REPOSTNG CATH STRL 60CM

## (undated) DEVICE — CATH IMG DRAGONFLY OPTIS 2.7F 135CM

## (undated) DEVICE — PRE-CONNECTED EXCHANGEABLE BURR CATHETER AND BURR ADVANCING DEVICE: Brand: ROTALINK™ PLUS

## (undated) DEVICE — CABL PACE ATRIAL PT BLU

## (undated) DEVICE — Device: Brand: ASAHI SION BLUE

## (undated) DEVICE — CANNULA,ADULT,SOFT-TOUCH,7'TUBE,UC: Brand: PENDING

## (undated) DEVICE — MODEL BT2000 P/N 700287-012KIT CONTENTS: MANIFOLD WITH SALINE AND CONTRAST PORTS, SALINE TUBING WITH SPIKE AND HAND SYRINGE, TRANSDUCER: Brand: BT2000 AUTOMATED MANIFOLD KIT

## (undated) DEVICE — CATH DIAG EXPO M/ PK 6FR FL4/FR4 PIG 3PK

## (undated) DEVICE — PINNACLE INTRODUCER SHEATH: Brand: PINNACLE

## (undated) DEVICE — LUER-LOK 360°: Brand: CONNECTA, LUER-LOK

## (undated) DEVICE — GW PRESSUREWIRE X WIRELESS FFR 175CM

## (undated) DEVICE — MODEL AT P54, P/N 700608-035KIT CONTENTS: HAND CONTROLLER, 3-WAY HIGH-PRESSURE STOPCOCK WITH ROTATING END AND PREMIUM HIGH-PRESSURE TUBING: Brand: ANGIOTOUCH® KIT

## (undated) DEVICE — GW TPR/CORE CERBRL HEP HN .035 15X260

## (undated) DEVICE — RADIFOCUS GLIDEWIRE: Brand: GLIDEWIRE

## (undated) DEVICE — CATH DIAG EXPO .056 FL3.5 6F 100CM

## (undated) DEVICE — GW J TP FIX CORE .035 150

## (undated) DEVICE — GUIDE CATHETER: Brand: MACH1™

## (undated) DEVICE — PK CATH CARD 10

## (undated) DEVICE — ST EXT IV SMARTSITE 2VLV SP M LL 5ML IV1

## (undated) DEVICE — DEV COMP RAD PRELUDESYNC 24CM

## (undated) DEVICE — Device

## (undated) DEVICE — ANGIO-SEAL VIP VASCULAR CLOSURE DEVICE: Brand: ANGIO-SEAL

## (undated) DEVICE — ST ACC MICROPUNCTURE .018 TRANSLSS/SS/TP 5F/10CM 21G/7CM

## (undated) DEVICE — Device: Brand: CORSAIR

## (undated) DEVICE — CATH PACE PACEL BIPOL 5F110CM

## (undated) DEVICE — KT VLV HEMO MAP ACC PLS LG/BORE MTL/INTRO W/TORQ/DEV

## (undated) DEVICE — WR ROTOBLATOR 3.25MM

## (undated) DEVICE — ST INF PRI SMRTSTE 20DRP 2VLV 24ML 117